# Patient Record
Sex: MALE | Race: WHITE | NOT HISPANIC OR LATINO | Employment: OTHER | URBAN - METROPOLITAN AREA
[De-identification: names, ages, dates, MRNs, and addresses within clinical notes are randomized per-mention and may not be internally consistent; named-entity substitution may affect disease eponyms.]

---

## 2024-04-15 ENCOUNTER — HOSPITAL ENCOUNTER (INPATIENT)
Facility: HOSPITAL | Age: 59
LOS: 2 days | Discharge: HOME | DRG: 377 | End: 2024-04-17
Attending: STUDENT IN AN ORGANIZED HEALTH CARE EDUCATION/TRAINING PROGRAM | Admitting: INTERNAL MEDICINE
Payer: MEDICARE

## 2024-04-15 DIAGNOSIS — K92.2 GASTROINTESTINAL HEMORRHAGE, UNSPECIFIED GASTROINTESTINAL HEMORRHAGE TYPE: Primary | ICD-10-CM

## 2024-04-15 DIAGNOSIS — N18.6 ESRD (END STAGE RENAL DISEASE) (MULTI): ICD-10-CM

## 2024-04-15 LAB
ABO GROUP (TYPE) IN BLOOD: NORMAL
ALBUMIN SERPL BCP-MCNC: 3.2 G/DL (ref 3.4–5)
ALP SERPL-CCNC: 28 U/L (ref 33–120)
ALT SERPL W P-5'-P-CCNC: 11 U/L (ref 10–52)
ANION GAP BLDV CALCULATED.4IONS-SCNC: 17 MMOL/L (ref 10–25)
ANION GAP SERPL CALC-SCNC: 21 MMOL/L (ref 10–20)
ANTIBODY SCREEN: NORMAL
APTT PPP: 33 SECONDS (ref 27–38)
AST SERPL W P-5'-P-CCNC: 17 U/L (ref 9–39)
BASE EXCESS BLDV CALC-SCNC: -2.1 MMOL/L (ref -2–3)
BASOPHILS # BLD AUTO: 0.09 X10*3/UL (ref 0–0.1)
BASOPHILS NFR BLD AUTO: 0.6 %
BILIRUB SERPL-MCNC: 0.3 MG/DL (ref 0–1.2)
BODY TEMPERATURE: 37 DEGREES CELSIUS
BUN SERPL-MCNC: 63 MG/DL (ref 6–23)
CA-I BLDV-SCNC: 1.2 MMOL/L (ref 1.1–1.33)
CALCIUM SERPL-MCNC: 9.1 MG/DL (ref 8.6–10.3)
CHLORIDE BLDV-SCNC: 98 MMOL/L (ref 98–107)
CHLORIDE SERPL-SCNC: 97 MMOL/L (ref 98–107)
CO2 SERPL-SCNC: 20 MMOL/L (ref 21–32)
CREAT SERPL-MCNC: 8.27 MG/DL (ref 0.5–1.3)
EGFRCR SERPLBLD CKD-EPI 2021: 7 ML/MIN/1.73M*2
EOSINOPHIL # BLD AUTO: 0.29 X10*3/UL (ref 0–0.7)
EOSINOPHIL NFR BLD AUTO: 2 %
ERYTHROCYTE [DISTWIDTH] IN BLOOD BY AUTOMATED COUNT: 15 % (ref 11.5–14.5)
GLUCOSE BLDV-MCNC: 101 MG/DL (ref 74–99)
GLUCOSE SERPL-MCNC: 101 MG/DL (ref 74–99)
HCO3 BLDV-SCNC: 23.7 MMOL/L (ref 22–26)
HCT VFR BLD AUTO: 29.6 % (ref 41–52)
HCT VFR BLD EST: 29 % (ref 41–52)
HGB BLD-MCNC: 9.4 G/DL (ref 13.5–17.5)
HGB BLDV-MCNC: 9.7 G/DL (ref 13.5–17.5)
IMM GRANULOCYTES # BLD AUTO: 0.09 X10*3/UL (ref 0–0.7)
IMM GRANULOCYTES NFR BLD AUTO: 0.6 % (ref 0–0.9)
INHALED O2 CONCENTRATION: 21 %
INR PPP: 1.5 (ref 0.9–1.1)
LACTATE BLDV-SCNC: 1.6 MMOL/L (ref 0.4–2)
LACTATE SERPL-SCNC: 0.9 MMOL/L (ref 0.4–2)
LYMPHOCYTES # BLD AUTO: 1.78 X10*3/UL (ref 1.2–4.8)
LYMPHOCYTES NFR BLD AUTO: 12.1 %
MCH RBC QN AUTO: 29.9 PG (ref 26–34)
MCHC RBC AUTO-ENTMCNC: 31.8 G/DL (ref 32–36)
MCV RBC AUTO: 94 FL (ref 80–100)
MONOCYTES # BLD AUTO: 0.8 X10*3/UL (ref 0.1–1)
MONOCYTES NFR BLD AUTO: 5.4 %
NEUTROPHILS # BLD AUTO: 11.64 X10*3/UL (ref 1.2–7.7)
NEUTROPHILS NFR BLD AUTO: 79.3 %
NRBC BLD-RTO: 0 /100 WBCS (ref 0–0)
OXYHGB MFR BLDV: 55.8 % (ref 45–75)
PCO2 BLDV: 44 MM HG (ref 41–51)
PH BLDV: 7.34 PH (ref 7.33–7.43)
PLATELET # BLD AUTO: 419 X10*3/UL (ref 150–450)
PO2 BLDV: 42 MM HG (ref 35–45)
POTASSIUM BLDV-SCNC: 5.4 MMOL/L (ref 3.5–5.3)
POTASSIUM SERPL-SCNC: 5.1 MMOL/L (ref 3.5–5.3)
PROT SERPL-MCNC: 6.9 G/DL (ref 6.4–8.2)
PROTHROMBIN TIME: 16.6 SECONDS (ref 9.8–12.8)
RBC # BLD AUTO: 3.14 X10*6/UL (ref 4.5–5.9)
RH FACTOR (ANTIGEN D): NORMAL
SAO2 % BLDV: 57 % (ref 45–75)
SODIUM BLDV-SCNC: 133 MMOL/L (ref 136–145)
SODIUM SERPL-SCNC: 133 MMOL/L (ref 136–145)
WBC # BLD AUTO: 14.7 X10*3/UL (ref 4.4–11.3)

## 2024-04-15 PROCEDURE — 86920 COMPATIBILITY TEST SPIN: CPT

## 2024-04-15 PROCEDURE — 84132 ASSAY OF SERUM POTASSIUM: CPT | Performed by: STUDENT IN AN ORGANIZED HEALTH CARE EDUCATION/TRAINING PROGRAM

## 2024-04-15 PROCEDURE — 2500000004 HC RX 250 GENERAL PHARMACY W/ HCPCS (ALT 636 FOR OP/ED): Performed by: STUDENT IN AN ORGANIZED HEALTH CARE EDUCATION/TRAINING PROGRAM

## 2024-04-15 PROCEDURE — C9113 INJ PANTOPRAZOLE SODIUM, VIA: HCPCS | Performed by: STUDENT IN AN ORGANIZED HEALTH CARE EDUCATION/TRAINING PROGRAM

## 2024-04-15 PROCEDURE — 82728 ASSAY OF FERRITIN: CPT | Mod: PARLAB

## 2024-04-15 PROCEDURE — 83540 ASSAY OF IRON: CPT

## 2024-04-15 PROCEDURE — 36415 COLL VENOUS BLD VENIPUNCTURE: CPT | Performed by: STUDENT IN AN ORGANIZED HEALTH CARE EDUCATION/TRAINING PROGRAM

## 2024-04-15 PROCEDURE — 99285 EMERGENCY DEPT VISIT HI MDM: CPT | Mod: 25

## 2024-04-15 PROCEDURE — 86900 BLOOD TYPING SEROLOGIC ABO: CPT | Performed by: STUDENT IN AN ORGANIZED HEALTH CARE EDUCATION/TRAINING PROGRAM

## 2024-04-15 PROCEDURE — 2500000001 HC RX 250 WO HCPCS SELF ADMINISTERED DRUGS (ALT 637 FOR MEDICARE OP)

## 2024-04-15 PROCEDURE — 85025 COMPLETE CBC W/AUTO DIFF WBC: CPT | Performed by: STUDENT IN AN ORGANIZED HEALTH CARE EDUCATION/TRAINING PROGRAM

## 2024-04-15 PROCEDURE — 96374 THER/PROPH/DIAG INJ IV PUSH: CPT

## 2024-04-15 PROCEDURE — 2060000001 HC INTERMEDIATE ICU ROOM DAILY

## 2024-04-15 PROCEDURE — 85730 THROMBOPLASTIN TIME PARTIAL: CPT | Performed by: STUDENT IN AN ORGANIZED HEALTH CARE EDUCATION/TRAINING PROGRAM

## 2024-04-15 PROCEDURE — 83605 ASSAY OF LACTIC ACID: CPT | Performed by: STUDENT IN AN ORGANIZED HEALTH CARE EDUCATION/TRAINING PROGRAM

## 2024-04-15 RX ORDER — METOPROLOL TARTRATE 25 MG/1
25 TABLET, FILM COATED ORAL 2 TIMES DAILY
COMMUNITY

## 2024-04-15 RX ORDER — ACETAMINOPHEN 160 MG/5ML
650 SOLUTION ORAL EVERY 4 HOURS PRN
Status: DISCONTINUED | OUTPATIENT
Start: 2024-04-15 | End: 2024-04-17 | Stop reason: HOSPADM

## 2024-04-15 RX ORDER — POLYETHYLENE GLYCOL 3350 17 G/17G
17 POWDER, FOR SOLUTION ORAL DAILY
Status: DISCONTINUED | OUTPATIENT
Start: 2024-04-16 | End: 2024-04-17 | Stop reason: HOSPADM

## 2024-04-15 RX ORDER — TRAZODONE HYDROCHLORIDE 50 MG/1
50 TABLET ORAL NIGHTLY
COMMUNITY

## 2024-04-15 RX ORDER — TRAZODONE HYDROCHLORIDE 50 MG/1
50 TABLET ORAL NIGHTLY
Status: DISCONTINUED | OUTPATIENT
Start: 2024-04-15 | End: 2024-04-17 | Stop reason: HOSPADM

## 2024-04-15 RX ORDER — PANTOPRAZOLE SODIUM 40 MG/10ML
80 INJECTION, POWDER, LYOPHILIZED, FOR SOLUTION INTRAVENOUS ONCE
Status: COMPLETED | OUTPATIENT
Start: 2024-04-15 | End: 2024-04-15

## 2024-04-15 RX ORDER — ACETAMINOPHEN 650 MG/1
650 SUPPOSITORY RECTAL EVERY 4 HOURS PRN
Status: DISCONTINUED | OUTPATIENT
Start: 2024-04-15 | End: 2024-04-17 | Stop reason: HOSPADM

## 2024-04-15 RX ORDER — METOPROLOL SUCCINATE 25 MG/1
25 TABLET, EXTENDED RELEASE ORAL
Status: ON HOLD | COMMUNITY
End: 2024-04-15 | Stop reason: WASHOUT

## 2024-04-15 RX ORDER — PANTOPRAZOLE SODIUM 40 MG/10ML
40 INJECTION, POWDER, LYOPHILIZED, FOR SOLUTION INTRAVENOUS 2 TIMES DAILY
Status: DISCONTINUED | OUTPATIENT
Start: 2024-04-16 | End: 2024-04-17 | Stop reason: HOSPADM

## 2024-04-15 RX ORDER — METOPROLOL TARTRATE 25 MG/1
25 TABLET, FILM COATED ORAL 2 TIMES DAILY
Status: DISCONTINUED | OUTPATIENT
Start: 2024-04-15 | End: 2024-04-17 | Stop reason: HOSPADM

## 2024-04-15 RX ORDER — ACETAMINOPHEN 325 MG/1
650 TABLET ORAL EVERY 4 HOURS PRN
Status: DISCONTINUED | OUTPATIENT
Start: 2024-04-15 | End: 2024-04-17 | Stop reason: HOSPADM

## 2024-04-15 RX ADMIN — SODIUM CHLORIDE 500 ML: 9 INJECTION, SOLUTION INTRAVENOUS at 20:56

## 2024-04-15 RX ADMIN — SODIUM CHLORIDE 500 ML: 9 INJECTION, SOLUTION INTRAVENOUS at 19:46

## 2024-04-15 RX ADMIN — TRAZODONE HYDROCHLORIDE 50 MG: 50 TABLET ORAL at 23:06

## 2024-04-15 RX ADMIN — PANTOPRAZOLE SODIUM 80 MG: 40 INJECTION, POWDER, FOR SOLUTION INTRAVENOUS at 19:47

## 2024-04-15 SDOH — SOCIAL STABILITY: SOCIAL INSECURITY: HAS ANYONE EVER THREATENED TO HURT YOUR FAMILY OR YOUR PETS?: NO

## 2024-04-15 SDOH — SOCIAL STABILITY: SOCIAL INSECURITY: DO YOU FEEL UNSAFE GOING BACK TO THE PLACE WHERE YOU ARE LIVING?: NO

## 2024-04-15 SDOH — ECONOMIC STABILITY: INCOME INSECURITY: IN THE LAST 12 MONTHS, WAS THERE A TIME WHEN YOU WERE NOT ABLE TO PAY THE MORTGAGE OR RENT ON TIME?: PATIENT DECLINED

## 2024-04-15 SDOH — SOCIAL STABILITY: SOCIAL INSECURITY: HAVE YOU HAD THOUGHTS OF HARMING ANYONE ELSE?: NO

## 2024-04-15 SDOH — SOCIAL STABILITY: SOCIAL INSECURITY: ARE YOU OR HAVE YOU BEEN THREATENED OR ABUSED PHYSICALLY, EMOTIONALLY, OR SEXUALLY BY ANYONE?: NO

## 2024-04-15 SDOH — ECONOMIC STABILITY: TRANSPORTATION INSECURITY
IN THE PAST 12 MONTHS, HAS LACK OF TRANSPORTATION KEPT YOU FROM MEETINGS, WORK, OR FROM GETTING THINGS NEEDED FOR DAILY LIVING?: PATIENT DECLINED

## 2024-04-15 SDOH — SOCIAL STABILITY: SOCIAL INSECURITY: DOES ANYONE TRY TO KEEP YOU FROM HAVING/CONTACTING OTHER FRIENDS OR DOING THINGS OUTSIDE YOUR HOME?: NO

## 2024-04-15 SDOH — ECONOMIC STABILITY: TRANSPORTATION INSECURITY
IN THE PAST 12 MONTHS, HAS THE LACK OF TRANSPORTATION KEPT YOU FROM MEDICAL APPOINTMENTS OR FROM GETTING MEDICATIONS?: PATIENT DECLINED

## 2024-04-15 SDOH — SOCIAL STABILITY: SOCIAL INSECURITY: ARE THERE ANY APPARENT SIGNS OF INJURIES/BEHAVIORS THAT COULD BE RELATED TO ABUSE/NEGLECT?: NO

## 2024-04-15 SDOH — SOCIAL STABILITY: SOCIAL INSECURITY: ABUSE: ADULT

## 2024-04-15 SDOH — ECONOMIC STABILITY: HOUSING INSECURITY
IN THE LAST 12 MONTHS, WAS THERE A TIME WHEN YOU DID NOT HAVE A STEADY PLACE TO SLEEP OR SLEPT IN A SHELTER (INCLUDING NOW)?: PATIENT DECLINED

## 2024-04-15 SDOH — ECONOMIC STABILITY: HOUSING INSECURITY: IN THE LAST 12 MONTHS, HOW MANY PLACES HAVE YOU LIVED?: 1

## 2024-04-15 SDOH — ECONOMIC STABILITY: INCOME INSECURITY: HOW HARD IS IT FOR YOU TO PAY FOR THE VERY BASICS LIKE FOOD, HOUSING, MEDICAL CARE, AND HEATING?: PATIENT DECLINED

## 2024-04-15 SDOH — SOCIAL STABILITY: SOCIAL INSECURITY: DO YOU FEEL ANYONE HAS EXPLOITED OR TAKEN ADVANTAGE OF YOU FINANCIALLY OR OF YOUR PERSONAL PROPERTY?: NO

## 2024-04-15 ASSESSMENT — COGNITIVE AND FUNCTIONAL STATUS - GENERAL
DAILY ACTIVITIY SCORE: 24
STANDING UP FROM CHAIR USING ARMS: A LITTLE
PATIENT BASELINE BEDBOUND: NO
MOBILITY SCORE: 20
CLIMB 3 TO 5 STEPS WITH RAILING: A LITTLE
WALKING IN HOSPITAL ROOM: A LITTLE
MOVING TO AND FROM BED TO CHAIR: A LITTLE

## 2024-04-15 ASSESSMENT — PAIN SCALES - GENERAL
PAINLEVEL_OUTOF10: 0 - NO PAIN
PAINLEVEL_OUTOF10: 0 - NO PAIN

## 2024-04-15 ASSESSMENT — ACTIVITIES OF DAILY LIVING (ADL)
LACK_OF_TRANSPORTATION: PATIENT DECLINED
FEEDING YOURSELF: INDEPENDENT
HEARING - LEFT EAR: FUNCTIONAL
JUDGMENT_ADEQUATE_SAFELY_COMPLETE_DAILY_ACTIVITIES: YES
TOILETING: INDEPENDENT
BATHING: INDEPENDENT
HEARING - RIGHT EAR: FUNCTIONAL
DRESSING YOURSELF: INDEPENDENT
GROOMING: INDEPENDENT
PATIENT'S MEMORY ADEQUATE TO SAFELY COMPLETE DAILY ACTIVITIES?: YES
WALKS IN HOME: INDEPENDENT
ADEQUATE_TO_COMPLETE_ADL: YES

## 2024-04-15 ASSESSMENT — LIFESTYLE VARIABLES
HAVE PEOPLE ANNOYED YOU BY CRITICIZING YOUR DRINKING: NO
SKIP TO QUESTIONS 9-10: 1
EVER HAD A DRINK FIRST THING IN THE MORNING TO STEADY YOUR NERVES TO GET RID OF A HANGOVER: NO
AUDIT-C TOTAL SCORE: 0
HOW OFTEN DO YOU HAVE A DRINK CONTAINING ALCOHOL: NEVER
HOW OFTEN DO YOU HAVE 6 OR MORE DRINKS ON ONE OCCASION: NEVER
TOTAL SCORE: 0
HOW MANY STANDARD DRINKS CONTAINING ALCOHOL DO YOU HAVE ON A TYPICAL DAY: PATIENT DOES NOT DRINK
EVER FELT BAD OR GUILTY ABOUT YOUR DRINKING: NO
HAVE YOU EVER FELT YOU SHOULD CUT DOWN ON YOUR DRINKING: NO
AUDIT-C TOTAL SCORE: 0

## 2024-04-15 ASSESSMENT — PATIENT HEALTH QUESTIONNAIRE - PHQ9
SUM OF ALL RESPONSES TO PHQ9 QUESTIONS 1 & 2: 0
1. LITTLE INTEREST OR PLEASURE IN DOING THINGS: NOT AT ALL
2. FEELING DOWN, DEPRESSED OR HOPELESS: NOT AT ALL

## 2024-04-15 ASSESSMENT — PAIN - FUNCTIONAL ASSESSMENT
PAIN_FUNCTIONAL_ASSESSMENT: 0-10
PAIN_FUNCTIONAL_ASSESSMENT: 0-10

## 2024-04-16 ENCOUNTER — APPOINTMENT (OUTPATIENT)
Dept: DIALYSIS | Facility: HOSPITAL | Age: 59
End: 2024-04-16
Payer: MEDICARE

## 2024-04-16 LAB
ALBUMIN SERPL BCP-MCNC: 2.8 G/DL (ref 3.4–5)
ANION GAP SERPL CALC-SCNC: 20 MMOL/L (ref 10–20)
APTT PPP: 32 SECONDS (ref 27–38)
BUN SERPL-MCNC: 70 MG/DL (ref 6–23)
CALCIUM SERPL-MCNC: 8.5 MG/DL (ref 8.6–10.3)
CHLORIDE SERPL-SCNC: 99 MMOL/L (ref 98–107)
CO2 SERPL-SCNC: 22 MMOL/L (ref 21–32)
CREAT SERPL-MCNC: 9.42 MG/DL (ref 0.5–1.3)
EGFRCR SERPLBLD CKD-EPI 2021: 6 ML/MIN/1.73M*2
ERYTHROCYTE [DISTWIDTH] IN BLOOD BY AUTOMATED COUNT: 14.8 % (ref 11.5–14.5)
ERYTHROCYTE [DISTWIDTH] IN BLOOD BY AUTOMATED COUNT: 14.9 % (ref 11.5–14.5)
FERRITIN SERPL-MCNC: 730 NG/ML (ref 20–300)
GLUCOSE SERPL-MCNC: 78 MG/DL (ref 74–99)
HCT VFR BLD AUTO: 21.4 % (ref 41–52)
HCT VFR BLD AUTO: 22.4 % (ref 41–52)
HGB BLD-MCNC: 6.8 G/DL (ref 13.5–17.5)
HGB BLD-MCNC: 7.1 G/DL (ref 13.5–17.5)
INR PPP: 1.5 (ref 0.9–1.1)
IRON SATN MFR SERPL: 29 % (ref 25–45)
IRON SERPL-MCNC: 45 UG/DL (ref 35–150)
MCH RBC QN AUTO: 29.2 PG (ref 26–34)
MCH RBC QN AUTO: 29.2 PG (ref 26–34)
MCHC RBC AUTO-ENTMCNC: 31.7 G/DL (ref 32–36)
MCHC RBC AUTO-ENTMCNC: 31.8 G/DL (ref 32–36)
MCV RBC AUTO: 92 FL (ref 80–100)
MCV RBC AUTO: 92 FL (ref 80–100)
NRBC BLD-RTO: 0 /100 WBCS (ref 0–0)
NRBC BLD-RTO: 0 /100 WBCS (ref 0–0)
PHOSPHATE SERPL-MCNC: 7.6 MG/DL (ref 2.5–4.9)
PLATELET # BLD AUTO: 355 X10*3/UL (ref 150–450)
PLATELET # BLD AUTO: 361 X10*3/UL (ref 150–450)
POTASSIUM SERPL-SCNC: 5.6 MMOL/L (ref 3.5–5.3)
PROTHROMBIN TIME: 16.5 SECONDS (ref 9.8–12.8)
RBC # BLD AUTO: 2.33 X10*6/UL (ref 4.5–5.9)
RBC # BLD AUTO: 2.43 X10*6/UL (ref 4.5–5.9)
SODIUM SERPL-SCNC: 135 MMOL/L (ref 136–145)
TIBC SERPL-MCNC: 156 UG/DL (ref 240–445)
UIBC SERPL-MCNC: 111 UG/DL (ref 110–370)
WBC # BLD AUTO: 6.3 X10*3/UL (ref 4.4–11.3)
WBC # BLD AUTO: 6.5 X10*3/UL (ref 4.4–11.3)

## 2024-04-16 PROCEDURE — 85610 PROTHROMBIN TIME: CPT

## 2024-04-16 PROCEDURE — C9113 INJ PANTOPRAZOLE SODIUM, VIA: HCPCS

## 2024-04-16 PROCEDURE — 8010000001 HC DIALYSIS - HEMODIALYSIS PER DAY

## 2024-04-16 PROCEDURE — 5A1D70Z PERFORMANCE OF URINARY FILTRATION, INTERMITTENT, LESS THAN 6 HOURS PER DAY: ICD-10-PCS | Performed by: INTERNAL MEDICINE

## 2024-04-16 PROCEDURE — 99222 1ST HOSP IP/OBS MODERATE 55: CPT | Performed by: NURSE PRACTITIONER

## 2024-04-16 PROCEDURE — 84100 ASSAY OF PHOSPHORUS: CPT

## 2024-04-16 PROCEDURE — 36415 COLL VENOUS BLD VENIPUNCTURE: CPT

## 2024-04-16 PROCEDURE — 2500000004 HC RX 250 GENERAL PHARMACY W/ HCPCS (ALT 636 FOR OP/ED)

## 2024-04-16 PROCEDURE — 85027 COMPLETE CBC AUTOMATED: CPT

## 2024-04-16 PROCEDURE — 2500000001 HC RX 250 WO HCPCS SELF ADMINISTERED DRUGS (ALT 637 FOR MEDICARE OP)

## 2024-04-16 PROCEDURE — 2060000001 HC INTERMEDIATE ICU ROOM DAILY

## 2024-04-16 RX ADMIN — IRON SUCROSE 400 MG: 20 INJECTION, SOLUTION INTRAVENOUS at 06:14

## 2024-04-16 RX ADMIN — PANTOPRAZOLE SODIUM 40 MG: 40 INJECTION, POWDER, FOR SOLUTION INTRAVENOUS at 00:21

## 2024-04-16 RX ADMIN — POLYETHYLENE GLYCOL 3350 17 G: 17 POWDER, FOR SOLUTION ORAL at 08:17

## 2024-04-16 RX ADMIN — TRAZODONE HYDROCHLORIDE 50 MG: 50 TABLET ORAL at 20:59

## 2024-04-16 RX ADMIN — PANTOPRAZOLE SODIUM 40 MG: 40 INJECTION, POWDER, FOR SOLUTION INTRAVENOUS at 21:00

## 2024-04-16 RX ADMIN — PANTOPRAZOLE SODIUM 40 MG: 40 INJECTION, POWDER, FOR SOLUTION INTRAVENOUS at 08:17

## 2024-04-16 ASSESSMENT — COGNITIVE AND FUNCTIONAL STATUS - GENERAL
MOBILITY SCORE: 24
DAILY ACTIVITIY SCORE: 24

## 2024-04-16 ASSESSMENT — PAIN SCALES - GENERAL
PAINLEVEL_OUTOF10: 0 - NO PAIN
PAINLEVEL_OUTOF10: 0 - NO PAIN

## 2024-04-16 NOTE — NURSING NOTE
Report from Sending RN:    Report From: Collette  Recent Surgery of Procedure: No  Baseline Level of Consciousness (LOC): a/o x3  Oxygen Use: No  Type: RA  Diabetic: No  Last BP Med Given Day of Dialysis: See MAR  Last Pain Med Given: See MAR  Lab Tests to be Obtained with Dialysis: No  Blood Transfusion to be Given During Dialysis: Yes  Available IV Access: Yes  Medications to be Administered During Dialysis: No  Continuous IV Infusion Running: No  Restraints on Currently or in the Last 24 Hours: No  Hand-Off Communication: full code; stable for hd; blood ordered for patient  Dialysis Catheter Dressing: N/A  Last Dressing Change: N/A

## 2024-04-16 NOTE — ED PROVIDER NOTES
HPI   Chief Complaint   Patient presents with   • Rectal Bleeding       Patient is a 58-year-old male history of ESRD presenting to the emergency department for bloody bowel movements.  He states that he had multiple bloody bowel movements earlier today he denies any abdominal pain nausea vomiting fever chills or any other muscle aches and pains.  He has no other muscle aches or pains to report of.  He did not lose consciousness however did feel mildly dizzy.                          [place] Coma Scale Score: 15                     Patient History   No past medical history on file.  No past surgical history on file.  No family history on file.  Social History     Tobacco Use   • Smoking status: Not on file   • Smokeless tobacco: Not on file   Substance Use Topics   • Alcohol use: Not on file   • Drug use: Not on file       Physical Exam   ED Triage Vitals   Temperature Heart Rate Respirations BP   04/15/24 1929 04/15/24 1929 04/15/24 1929 04/15/24 1929   36.5 °C (97.7 °F) 83 20 118/77      Pulse Ox Temp src Heart Rate Source Patient Position   04/15/24 1929 -- 04/15/24 2013 04/15/24 2013   97 %  Monitor Lying      BP Location FiO2 (%)     04/15/24 2013 --     Right arm        Physical Exam  Vitals reviewed.   Constitutional:       Appearance: Normal appearance.   HENT:      Head: Normocephalic and atraumatic.      Nose: Nose normal.      Mouth/Throat:      Mouth: Mucous membranes are moist.   Eyes:      Extraocular Movements: Extraocular movements intact.      Conjunctiva/sclera: Conjunctivae normal.   Cardiovascular:      Rate and Rhythm: Normal rate and regular rhythm.      Pulses: Normal pulses.      Heart sounds: Normal heart sounds.   Pulmonary:      Effort: Pulmonary effort is normal.      Breath sounds: Normal breath sounds.   Abdominal:      General: Abdomen is flat. Bowel sounds are normal.      Palpations: Abdomen is soft.      Tenderness: There is no abdominal tenderness.   Musculoskeletal:          General: Normal range of motion.   Skin:     General: Skin is warm and dry.      Capillary Refill: Capillary refill takes less than 2 seconds.   Neurological:      Mental Status: He is alert and oriented to person, place, and time. Mental status is at baseline.      Cranial Nerves: Cranial nerves 2-12 are intact. No cranial nerve deficit.      Sensory: Sensation is intact. No sensory deficit.      Motor: Motor function is intact. No weakness.   Psychiatric:         Mood and Affect: Mood normal.         ED Course & MDM   ED Course as of 04/15/24 2045   Mon Apr 15, 2024   1920 58-year-old history of history of ESRD presenting to the emergency department for bloody bowel movements.  On examination vital signs are stable 2+ peripheral pulses abdomen nontender.  He is mildly jaundiced appearing however has no right upper quadrant abdominal tenderness.  No rebound no guarding no rigidity.  He has a differential diagnosis of upper GI bleed versus lower GI bleed versus cancer.  Since patient is currently not actively hemorrhaging and not hypotensive and has no abdominal pain CT imaging will be deferred.  I did discuss the case with Dr. Ceballos he agrees. [ZS]   2040 Vital signs continuous predominately stable last blood pressure is 90/62.  Will give another 500 cc bolus of IV fluids hemoglobin is 9.4 plaqued is 0.9 patient's not acidotic on blood gas CMP is consistent with ESRD.  Patient will be admitted to stepdown unit to Dr. Rosado's service. [ZS]      ED Course User Index  [ZS] Caitlyn Neal MD         Diagnoses as of 04/15/24 2045   Gastrointestinal hemorrhage, unspecified gastrointestinal hemorrhage type   ESRD (end stage renal disease) (Multi)       Medical Decision Making      Procedure  Procedures     Caitlyn Neal MD  04/15/24 2045

## 2024-04-16 NOTE — CONSULTS
Reason For Consult  GI bleed    History Of Present Illness  Rai Gant is a 58 y.o. 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 He earlier went for a prostate biopsy today with urologist at [facility] who made him aware that having some blood in his stools was to be expected after undergoing biopsy.   He denies any abdominal pain, nausea, vomiting, any other recent diarrhea.  He denies any shortness of breath, chest pain, palpitations.   He is currently on the waiting list for a kidney transplant at [facility].       Consult for GI bleed.  Patient was seen and examined at the bedside in the dialysis in patient's room.  He denied any abdominal or epigastric pain, nausea nausea or dysphagia.  No previous history of upper or lower GI bleed.  He describes approximately 6-7 dark red bloody BMs yesterday after returning biopsy appointment.  Last BM approximately 8 PM, no BMs so far today, however he states he feels urge to move his bowels.  No NSAIDs use    Colonoscopy 5/25/2023: Three 1 to 3 mm polyps in the ascending colon and in the cecum removed with cold snare resected and retrieved.  Diverticulosis in the entire examined colon.  Nonbleeding internal hemorrhoids    H&H today 7.1, on admission 9.4.  No baseline available.  No leukocytosis or thrombocytopenia.  INR 1.5.  Serum sodium 135, potassium 5.62-day before dialysis session, creatinine 9.42, BUN 70, serum iron 45, ferritin 730, TIBC 156, saturation 29    Past Medical History  He has no past medical history on file.    Surgical History  He has no past surgical history on file.     Social History  He reports that he has never smoked. He does not have any smokeless tobacco history on file. He reports that he does not  "drink alcohol. No history on file for drug use.    Family History  No family history on file.     Allergies  Patient has no known allergies.    Review of Systems     A 10 point review of system is negative except for what is mentioned in the HPI    Physical Exam    The note was created using voice recognition transcription software. Despite proofreading, unintentional typographical errors may be present. Please contact the GI office with any questions or concerns.     Current Medications: reviewed    Vital Signs: Reviewed    Physical Exam:  General: no apparent distress, pleasant and cooperative  Skin:  Warm and dry, no jaundice  HEENT: No scleral icterus, no conjunctival pallor, normocephalic, atraumatic, mucous membranes moist  Neck:  atraumatic, trachea midline, no JVD  Chest:  decreased air entry to auscultation bilaterally. No wheezes, rales, or rhonchi  CV:  Regular rate and rhythm.  Positive S1/S2  Abdomen: no distension, +BS, soft, non-tender to palpation, no rebound tenderness, no guarding, no rigidity, no discernible ascites   Extremities: no lower extremity edema, Chronic pigmentary changes, no cyanosis  Neurological:  A&Ox3 , no asterixis  Psychiatric: cooperative     Investigations:  Labs, radiological imaging and cardiac work up were reviewed    Last Recorded Vitals  Blood pressure 107/61, pulse 84, temperature 36.8 °C (98.2 °F), temperature source Temporal, resp. rate 20, height 1.722 m (5' 7.8\"), weight 64 kg (141 lb 1.5 oz), SpO2 95%.    Relevant Results      Scheduled medications  [Held by provider] metoprolol tartrate, 25 mg, oral, BID  pantoprazole, 40 mg, intravenous, BID  polyethylene glycol, 17 g, oral, Daily  traZODone, 50 mg, oral, Nightly      Continuous medications     PRN medications  PRN medications: acetaminophen **OR** acetaminophen **OR** acetaminophen    Results for orders placed or performed during the hospital encounter of 04/15/24 (from the past 24 hour(s))   Coagulation Screen "   Result Value Ref Range    Protime 16.6 (H) 9.8 - 12.8 seconds    INR 1.5 (H) 0.9 - 1.1    aPTT 33 27 - 38 seconds   CBC and Auto Differential   Result Value Ref Range    WBC 14.7 (H) 4.4 - 11.3 x10*3/uL    nRBC 0.0 0.0 - 0.0 /100 WBCs    RBC 3.14 (L) 4.50 - 5.90 x10*6/uL    Hemoglobin 9.4 (L) 13.5 - 17.5 g/dL    Hematocrit 29.6 (L) 41.0 - 52.0 %    MCV 94 80 - 100 fL    MCH 29.9 26.0 - 34.0 pg    MCHC 31.8 (L) 32.0 - 36.0 g/dL    RDW 15.0 (H) 11.5 - 14.5 %    Platelets 419 150 - 450 x10*3/uL    Neutrophils % 79.3 40.0 - 80.0 %    Immature Granulocytes %, Automated 0.6 0.0 - 0.9 %    Lymphocytes % 12.1 13.0 - 44.0 %    Monocytes % 5.4 2.0 - 10.0 %    Eosinophils % 2.0 0.0 - 6.0 %    Basophils % 0.6 0.0 - 2.0 %    Neutrophils Absolute 11.64 (H) 1.20 - 7.70 x10*3/uL    Immature Granulocytes Absolute, Automated 0.09 0.00 - 0.70 x10*3/uL    Lymphocytes Absolute 1.78 1.20 - 4.80 x10*3/uL    Monocytes Absolute 0.80 0.10 - 1.00 x10*3/uL    Eosinophils Absolute 0.29 0.00 - 0.70 x10*3/uL    Basophils Absolute 0.09 0.00 - 0.10 x10*3/uL   Comprehensive Metabolic Panel   Result Value Ref Range    Glucose 101 (H) 74 - 99 mg/dL    Sodium 133 (L) 136 - 145 mmol/L    Potassium 5.1 3.5 - 5.3 mmol/L    Chloride 97 (L) 98 - 107 mmol/L    Bicarbonate 20 (L) 21 - 32 mmol/L    Anion Gap 21 (H) 10 - 20 mmol/L    Urea Nitrogen 63 (H) 6 - 23 mg/dL    Creatinine 8.27 (H) 0.50 - 1.30 mg/dL    eGFR 7 (L) >60 mL/min/1.73m*2    Calcium 9.1 8.6 - 10.3 mg/dL    Albumin 3.2 (L) 3.4 - 5.0 g/dL    Alkaline Phosphatase 28 (L) 33 - 120 U/L    Total Protein 6.9 6.4 - 8.2 g/dL    AST 17 9 - 39 U/L    Bilirubin, Total 0.3 0.0 - 1.2 mg/dL    ALT 11 10 - 52 U/L   Blood Gas Venous Full Panel   Result Value Ref Range    POCT pH, Venous 7.34 7.33 - 7.43 pH    POCT pCO2, Venous 44 41 - 51 mm Hg    POCT pO2, Venous 42 35 - 45 mm Hg    POCT SO2, Venous 57 45 - 75 %    POCT Oxy Hemoglobin, Venous 55.8 45.0 - 75.0 %    POCT Hematocrit Calculated, Venous 29.0 (L)  41.0 - 52.0 %    POCT Sodium, Venous 133 (L) 136 - 145 mmol/L    POCT Potassium, Venous 5.4 (H) 3.5 - 5.3 mmol/L    POCT Chloride, Venous 98 98 - 107 mmol/L    POCT Ionized Calicum, Venous 1.20 1.10 - 1.33 mmol/L    POCT Glucose, Venous 101 (H) 74 - 99 mg/dL    POCT Lactate, Venous 1.6 0.4 - 2.0 mmol/L    POCT Base Excess, Venous -2.1 (L) -2.0 - 3.0 mmol/L    POCT HCO3 Calculated, Venous 23.7 22.0 - 26.0 mmol/L    POCT Hemoglobin, Venous 9.7 (L) 13.5 - 17.5 g/dL    POCT Anion Gap, Venous 17.0 10.0 - 25.0 mmol/L    Patient Temperature 37.0 degrees Celsius    FiO2 21 %   Lactate   Result Value Ref Range    Lactate 0.9 0.4 - 2.0 mmol/L   Iron and TIBC   Result Value Ref Range    Iron 45 35 - 150 ug/dL    UIBC 111 110 - 370 ug/dL    TIBC 156 (L) 240 - 445 ug/dL    % Saturation 29 25 - 45 %   Ferritin   Result Value Ref Range    Ferritin 730 (H) 20 - 300 ng/mL   Type And Screen   Result Value Ref Range    ABO TYPE O     Rh TYPE NEG     ANTIBODY SCREEN NEG    CBC   Result Value Ref Range    WBC 6.5 4.4 - 11.3 x10*3/uL    nRBC 0.0 0.0 - 0.0 /100 WBCs    RBC 2.33 (L) 4.50 - 5.90 x10*6/uL    Hemoglobin 6.8 (L) 13.5 - 17.5 g/dL    Hematocrit 21.4 (L) 41.0 - 52.0 %    MCV 92 80 - 100 fL    MCH 29.2 26.0 - 34.0 pg    MCHC 31.8 (L) 32.0 - 36.0 g/dL    RDW 14.9 (H) 11.5 - 14.5 %    Platelets 355 150 - 450 x10*3/uL   Renal Function Panel   Result Value Ref Range    Glucose 78 74 - 99 mg/dL    Sodium 135 (L) 136 - 145 mmol/L    Potassium 5.6 (H) 3.5 - 5.3 mmol/L    Chloride 99 98 - 107 mmol/L    Bicarbonate 22 21 - 32 mmol/L    Anion Gap 20 10 - 20 mmol/L    Urea Nitrogen 70 (H) 6 - 23 mg/dL    Creatinine 9.42 (H) 0.50 - 1.30 mg/dL    eGFR 6 (L) >60 mL/min/1.73m*2    Calcium 8.5 (L) 8.6 - 10.3 mg/dL    Phosphorus 7.6 (H) 2.5 - 4.9 mg/dL    Albumin 2.8 (L) 3.4 - 5.0 g/dL   Coagulation Screen   Result Value Ref Range    Protime 16.5 (H) 9.8 - 12.8 seconds    INR 1.5 (H) 0.9 - 1.1    aPTT 32 27 - 38 seconds   CBC   Result Value Ref  Range    WBC 6.3 4.4 - 11.3 x10*3/uL    nRBC 0.0 0.0 - 0.0 /100 WBCs    RBC 2.43 (L) 4.50 - 5.90 x10*6/uL    Hemoglobin 7.1 (L) 13.5 - 17.5 g/dL    Hematocrit 22.4 (L) 41.0 - 52.0 %    MCV 92 80 - 100 fL    MCH 29.2 26.0 - 34.0 pg    MCHC 31.7 (L) 32.0 - 36.0 g/dL    RDW 14.8 (H) 11.5 - 14.5 %    Platelets 361 150 - 450 x10*3/uL   Prepare RBC: 1 Units   Result Value Ref Range    PRODUCT CODE H8424Q63     Unit Number [id number]     Unit ABO O     Unit RH NEG     XM INTEP COMP     Dispense Status XM     Blood Expiration Date May 22, 2024 23:59 EDT     PRODUCT BLOOD TYPE 9500     UNIT VOLUME 350           Assessment/Plan     Rai Gant is a 58 y.o. 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 He earlier went for a prostate biopsy the same day with urologist at [facility].  He is currently on the waiting list for a kidney transplant at [facility].       Colonoscopy 5/25/2023: Three 1 to 3 mm polyps in the ascending colon and in the cecum removed with cold snare resected and retrieved.  Diverticulosis in the entire examined colon.  Nonbleeding internal hemorrhoids    Consult for GI bleed.    At the moment of consult patient states that the last bloody bowel movement last night around 8 PM, no new or rectal bleed since then however he feels urge to move his bowels again.  H&H down to 7.1 from 9.4 on arrival, no baseline available.  Most likely patient he has anemia of chronic disease due to his kidney failure, serum iron studies inconsistent with SERGEY.  I agree with medicine that diverticular bleed cannot be completely ruled out at this time, however quiet less likely.  Most likely patient experiences heightened rectal bleed after prostate biopsy.  Discussed with urology who  stated usually post prostate biopsy bleeding resolves only its own which is most often the case after episode of diverticular bleed as well.  In case of prolonged, persistent bleed which would not stop, might consider flex sigmoidoscopy however at this time will choose conservative approach.  Serial CBC, active type and screen on file, 2 large-bore IV access, replenish if below 7.  Daily abdominal exam.  Please report to GI any subsequent bloody bowel movements.  Patient discussed with Dr. Kidd  Will follow    I spent 60 minutes in the professional and overall care of this patient.      Sharon Perales, APRN-CNP

## 2024-04-16 NOTE — H&P
History Of Present Illness  Rai Gant is a 58 y.o. 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  History was taken through  due to him speaking [place].  He states that he felt dizzy while getting off the toilet today.  He earlier went for a prostate biopsy with urologist at [facility] who made him aware that having some blood in his stools was to be expected after undergoing biopsy.  He states that his urologist told him that having blood in his bowel movements can be normal after undergoing a transrectal prostate biopsy.  He denies any abdominal pain, nausea, vomiting, any other recent diarrhea.  He denies any shortness of breath, chest pain, palpitations.  He also denies any hematuria or any urinary symptoms.  He does not make much urine and is a dialysis patient undergoes dialysis Tuesday Thursday and Saturday.  He states himself that he is a non-smoker.  Does not drink alcohol.  He is currently on the waiting list for a kidney transplant at [facility].  He also states that the only two medications he takes are Lopressor and trazodone.    On arrival to the ED, BP 90/62.  HR 83.  RR 20.  97% SpO2 on RA.  Afebrile.  Significant labs showed sodium 133.  Creatinine 8.27.  BUN 63.  Bicarbonate 20.  WCC 15.  Neutrophils 12.  Hemoglobin 9.4 (BL 9-10).  Hematocrit 30 (BL 30-35).  Anion gap 21.  He is being admitted to medicine team for further management of suspected GI bleed.    Colonoscopy 5/25/2023: Three 1 to 3 mm polyps in the ascending colon and in the cecum removed with cold snare resected and retrieved.  Diverticulosis in the entire examined colon.  Nonbleeding internal hemorrhoids    CODE STATUS: Full code    Atulti  used.     Past Medical  "History  As above    Surgical History  As above     Social History  He has no history on file for tobacco use, alcohol use, and drug use.    Family History  No family history on file.     Allergies  Patient has no known allergies.    Review of Systems   A 12 point review of systems was performed and otherwise negative except as stated in the HPI.   Physical Exam  General:  Pleasant and cooperative. No apparent distress.  HEENT:  Normocephalic, atraumatic, mucus membranes moist.   Neck:  Trachea midline.  No JVD.    Chest:  Clear to auscultation bilaterally. No wheezes, rales, or rhonchi.  CV:  Regular rate and rhythm.  Positive S1/S2.   Abdomen: Bowel sounds present in all four quadrants, abdomen is soft, non-tender, non-distended.  Extremities:  No lower extremity edema or cyanosis.   Neurological:  AAOx3. No focal deficits.  Skin:  Warm and dry.   Last Recorded Vitals  Blood pressure 123/58, pulse 89, temperature 35.9 °C (96.6 °F), resp. rate 18, height 1.722 m (5' 7.8\"), weight 64 kg (141 lb 1.5 oz), SpO2 97%.    Relevant Results  All labs and images were reviewed by myself.     Assessment/Plan   Rai Atkins is a 58-year-old male with a PMH of HTN, ESRD on iHD TThS 2/2 ADPCKD, HFmrEF, pAF (not on AC), HLD, BPH, HCV presented to hospital with sudden onset of 7 dark red bloody bowel movements that consisted of large clots followed by a watery consistency.  This all occurred after undergoing prostate biopsy earlier in the day.  He exhibited symptoms of dizziness.  Hemoglobin 9.4 with baseline of 9-10.  Hematocrit 30 (BL 30-35).  He has been admitted to medicine team for further management of suspected GI bleed.    # Suspected GI bleed  # Symptomatic anemia  # Iron deficiency anemia  # BPH s/p transrectal prostate biopsy  # ESRD on HD-T TH S  # Autosomal dominant polycystic kidney disease  - This is a patient presenting with 7 episodes of dark red bloody bowel movements consisting of large clots as described by " him.  This was followed by a watery consistency.  Hemoglobin and hematocrit are at baseline.  Only symptoms are dizziness when he was try to get off the toilet however this has resolved.  Also it can be normal to have blood in in bowel movements after undergoing a transrectal prostate biopsy, the bleeding was quite extensive and it did lead to him feeling dizzy.  Last colonoscopy done in May 2023 showed polyps that were removed and also diverticulosis along the entire colon.  It may be that the patient is having lower GI bleed secondary to diverticulosis.    Plan:  - Hemoglobin is at baseline.  We will continue to monitor H&H.  Will also continue to monitor patient's bowel movements and if any further episodes of blood occur.  We will also type and screen.  If hemoglobin becomes less than 7, we will transfuse PRC.  - ED has consulted GI team for further recommendations.  Dr. Ceballos is advised for inpatient admission for further assessment.  - Avoid NSAIDs, blood thinners.  We will keep patient n.p.o in preparation if any endoscopy is to be performed by GI team.  - We will also give Protonix IV 80 mg once followed by IV 40 mg twice daily for gastric protection.  - Recent iron level on 4/12/2024 showed iron level 22.  Due to recent blood loss from this presentation, we will give IV Venofer.  - Due to patient presenting to ED initially with hypotension with BP 90 systolic, we will hold his home Lopressor 25 twice daily.  Will continue to monitor BP closely however was successfully resuscitated with fluid bolus.    # HFmrEF  # HTN, HLD  # PAF-no AC  # HCV  - Continue home trazodone    - DVT PPx: None        Snow Soto MD  PGY1 internal medicine

## 2024-04-16 NOTE — CONSULTS
Reason For Consult  ESRD    History Of Present Illness  Rai Gant is a 58 y.o. male presenting with rectal bleeding.  He has a history of end-stage renal disease due to polycystic kidney disease and is on dialysis every Tuesday Thursday Saturday.  He last went to dialysis on Saturday.  He speaks primarily Turkmen so most of the history obtained from the chart as well as limited conversation with the patient.  Apparently presented to the hospital yesterday with dark red bloody bowel movements and large blood clots after a recent prostate biopsy.  Hemoglobin initially was 9.4 which dropped to 6.8 today.  He is going to be receiving a blood transfusion.  He makes minimal urine given his dialysis status.  Denies any abdominal pain.  Denies any nausea or vomiting.  Denies any chest pain or shortness of breath.  Has chronic bilateral pleural effusions and had a previous parapneumonic effusion that was treated with a chest tube and antibiotic therapy.  He gets most of his care at the [facility].    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 Currently dialyzing at [facility] under the care of Dr. Ramirez via left arm AVF. He has been on dialysis since 2021. Being worked up for renal transplant at [facility]. She receives mircera and calcitriol with dialysis. Typically receives heparin as well with dialysis. Of note, Hb was 11.7 earlier this month.     Past Medical History  ESRD on HD due to ADPKD; MRA brain in 2024 without overt aneurysm  Nephrolithiasis  Hepatitis C  BPH with elevated PSA  Hypertension with EF 49% and grade 2 diastolic dysfunction  Tubular adenoma in colon, 5/2023  Parapneumonic effusion in January 2024 after COVID.  Had a chest tube and pigtail catheter now removed.  He has chronic bilateral pleural effusions per [facility] documentation    Surgical History  AVF left arm with thrombectomy  L chest tube now removed     Social History  He reports that he has never smoked. He does not have any smokeless  "tobacco history on file. He reports that he does not drink alcohol. No history on file for drug use.    Family History  Brother with PKD     Allergies  Patient has no known allergies.    Medications  Current Outpatient Medications   Medication Instructions    metoprolol tartrate (LOPRESSOR) 25 mg, oral, 2 times daily    traZODone (DESYREL) 50 mg, oral, Nightly        Review of Systems  Please see HPI     Physical Exam  General: No apparent distress  Skin: Somewhat pale  HEENT: No scleral icterus  Neck: No jugular venous distention  Respiratory: Diminished at the bases but otherwise clear  CVS: No rub  Abdomen: Soft  Extremities: Little to no peripheral edema  Neuro: Speaks primarily Australian.  Appears to be alert and oriented         I&O 24HR    Intake/Output Summary (Last 24 hours) at 4/16/2024 1040  Last data filed at 4/16/2024 1005  Gross per 24 hour   Intake 1100 ml   Output --   Net 1100 ml       Vitals 24HR  Heart Rate:  [76-98]   Temp:  [35.9 °C (96.6 °F)-36.8 °C (98.2 °F)]   Resp:  [18-20]   BP: ()/(55-77)   Height:  [172.2 cm (5' 7.8\")-172.7 cm (5' 8\")]   Weight:  [64 kg (141 lb 1.5 oz)]   SpO2:  [94 %-97 %]       Relevant Results  K 5.6, HCO 22, Cr 9.4  Ca 8.5, Phos 7.6  Hb 6.8 from 9.4.  Was greater than 11 at the beginning of the month per outpatient dialysis unit     Assessment/Plan   ESRD on HD TRS due to PKD; usually treats at [facility] [place] [place] [address] has chronic bilateral pleural effusions but does not appear to be volume overloaded  Hyperkalemia due to #1  Acute anemia due to GI bleed after prostate biopsy; history of tubular adenoma in the past. Hb earlier this month was 11.7 currently < 7  Hyperphosphatemia  Hypertension currently with soft Bps in setting of GI bleed    Plan:  HD today with low K bath and UF as tolerated given pulmonary congestion on imaging and need for transfusion. No heparin  Restart phos binders placed on a diet  GI evaluation  Agree with transfusion  Will follow with you in " house    Thanks for involving me in the care of this patient.  If any questions, please do not hesitate to call      Ace Byrd MD

## 2024-04-16 NOTE — PROGRESS NOTES
04/16/24 1453   Discharge Planning   Living Arrangements Spouse/significant other   Support Systems Spouse/significant other   Assistance Needed Independent, patient does drive   Type of Residence Private residence   Home or Post Acute Services None   Patient expects to be discharged to: Home     Met with patient at bedside, introduced self and role on care transitions team. Admission assessment completed with patient. Address and insurance verified. Patient states he goes to dialysis on Tuesday, Thursday and Saturday at [facility] on [address]. Patient confirms plan to return home at discharge. Patient has declined any home going needs.

## 2024-04-16 NOTE — NURSING NOTE
Report to Receiving RN:    Report To: Collette  Time Report Called: 14:16  Hand-Off Communication: tx completed; pt tolerated tx well; 3 liters of fluid removed; vss post tx; no s/s of distress post tx; pt discharged stable back to nursing floor  Complications During Treatment: No  Ultrafiltration Treatment: No  Medications Administered During Dialysis: No  Blood Products Administered During Dialysis: No  Labs Sent During Dialysis: No  Heparin Drip Rate Changes: No  Dialysis Catheter Dressing: N/A  Last Dressing Change: N/A    Electronic Signatures:  Tiffanie Corley RN (Signed CE)     Last Updated: 2:18 PM by TIFFANIE CORLEY

## 2024-04-16 NOTE — NURSING NOTE
Report from Sending RN:    Report From: Collette  Recent Surgery of Procedure: No  Baseline Level of Consciousness (LOC): a/o x3; esl  Oxygen Use: N/A  Type: N/A  Diabetic: Yes  Last BP Med Given Day of Dialysis: See MAR  Last Pain Med Given: See MAR  Lab Tests to be Obtained with Dialysis: N/A  Blood Transfusion to be Given During Dialysis: N/A  Available IV Access: Yes  Medications to be Administered During Dialysis: No  Continuous IV Infusion Running: No  Restraints on Currently or in the Last 24 Hours: No  Hand-Off Communication: full code; stable for hd

## 2024-04-16 NOTE — CARE PLAN
The patient's goals for the shift include      The clinical goals for the shift include comfort    Problem: Pain  Goal: My pain/discomfort is manageable  Outcome: Progressing

## 2024-04-16 NOTE — CARE PLAN
The patient's goals for the shift include      Problem: Pain  Goal: My pain/discomfort is manageable  Outcome: Progressing     Problem: Safety  Goal: Patient will be injury free during hospitalization  Outcome: Progressing     Problem: Daily Care  Goal: Daily care needs are met  Outcome: Progressing     Problem: Psychosocial Needs  Goal: Demonstrates ability to cope with hospitalization/illness  Outcome: Progressing  Goal: Collaborate with me, my family, and caregiver to identify my specific goals  Recent Flowsheet Documentation  Taken 4/15/2024 2222 by Kay Mayes RN  Cultural Requests During Hospitalization: n/a  Spiritual Requests During Hospitalization: n/a

## 2024-04-16 NOTE — PROGRESS NOTES
"Rai Gant is a 58 y.o. male on day 1 of admission presenting with Gastrointestinal hemorrhage, unspecified gastrointestinal hemorrhage type.      Assessment / Plan        Suspected GI bleed  Symptomatic anemia  Iron deficiency anemia  BPH s/p transrectal prostate biopsy  Plan:  -Consulted GI, awaiting recs  -Monitor hemoglobin level  -Avoid NSAIDs, blood thinners  -Continue Protonix twice daily  -Recent iron level on 4/12 showed iron level of 12, was given IV Venofer  -Held home Lopressor 25    ESRD on HD-T TH S  Autosomal dominant polycystic kidney disease  Plan:  -Consulted nephrology, underwent dialysis today    Chronic Medical conditions  HFmrEF  HTN, HLD  PAF-no AC  HCV  Plan:  -Continue home trazodone    DVT ppx: None  IVF: None  Diet: N.p.o.  Consults: GI, nephrology    Care Planning/PT-OT: Not indicated       Alpesh Catalan MD   PGY-1, Internal Medicine  This is a preliminary note, please await attending attestation for final A/P    Subjective     Patient seen and examined. No acute overnight events.  Patient denies any new bloody bowel movements.  Patient was consented in the event that he may need blood.  Patient denies any abdominal pain, chest pain, difficulty breathing, or any other new/acute symptoms      Objective       Physical Exam:  General: No apparent distress  Skin: Somewhat pale  HEENT: No scleral icterus  Neck: No jugular venous distention  Respiratory: Diminished at the bases but otherwise clear  CVS: No rub  Abdomen: Soft  Extremities: Little to no peripheral edema  Neuro: Speaks primarily Persian.  Appears to be alert and oriented       Last Recorded Vitals  Blood pressure 119/74, pulse 99, temperature 35.8 °C (96.4 °F), resp. rate 20, height 1.722 m (5' 7.8\"), weight 64 kg (141 lb 1.5 oz), SpO2 96%.  Intake/Output last 3 Shifts:  I/O last 3 completed shifts:  In: 500 (7.8 mL/kg) [IV Piggyback:500]  Out: - (0 mL/kg)   Weight: 64 kg     Last CBC & BMP  Lab Results   Component Value " Date    GLUCOSE 78 04/16/2024    CALCIUM 8.5 (L) 04/16/2024     (L) 04/16/2024    K 5.6 (H) 04/16/2024    CO2 22 04/16/2024    CL 99 04/16/2024    BUN 70 (H) 04/16/2024    CREATININE 9.42 (H) 04/16/2024     Lab Results   Component Value Date    WBC 6.3 04/16/2024    HGB 7.1 (L) 04/16/2024    HCT 22.4 (L) 04/16/2024    MCV 92 04/16/2024     04/16/2024

## 2024-04-16 NOTE — CONSULTS
"Nutrition Initial Assessment:   Nutrition Assessment    Reason for Assessment: Admission nursing screening    Patient is a 58 y.o. male presenting with suspected GI bleed      Nutrition History:  Food and Nutrient History: Pt remains NPO due to suspected GI bleed.  He was at dialysis today  Food Allergies/Intolerances:  None  GI Symptoms:  suspected GI bleed  Oral Problems: None       Anthropometrics:  Height: 172.2 cm (5' 7.8\")   Weight: 64 kg (141 lb 1.5 oz)   BMI (Calculated): 21.58  IBW/kg (Dietitian Calculated): 70 kg  Percent of IBW: 91 %       Weight History:   Wt Readings from Last 10 Encounters:   04/16/24 64 kg (141 lb 1.5 oz)         Weight Change %:       Nutrition Focused Physical Exam Findings:    Subcutaneous Fat Loss:   Orbital Fat Pads:  (pt was not available)  Muscle Wasting:     Edema:     Physical Findings:       Nutrition Significant Labs:  BMP Trend:   Results from last 7 days   Lab Units 04/16/24  0552 04/15/24  1944   GLUCOSE mg/dL 78 101*   CALCIUM mg/dL 8.5* 9.1   SODIUM mmol/L 135* 133*   POTASSIUM mmol/L 5.6* 5.1   CO2 mmol/L 22 20*   CHLORIDE mmol/L 99 97*   BUN mg/dL 70* 63*   CREATININE mg/dL 9.42* 8.27*        Nutrition Specific Medications:  Protonix; miralax    I/O:    ; Stool Appearance: Other (Comment) (04/16/24 0900)    Dietary Orders (From admission, onward)       Start     Ordered    04/15/24 2252  NPO Diet; Effective now  Diet effective now         04/15/24 2253                     Estimated Needs:      Method for Estimating Needs: 7748-4295    MSJ   calculated for dialysis     Method for Estimating Needs:   1.2-1.5 gm pro kg of IBW  for dialysis     Method for Estimating Needs: 6608-8583    20-30  ml kg of IBW as medically indicated        Nutrition Diagnosis        Nutrition Diagnosis  Patient has Nutrition Diagnosis: Yes  Diagnosis Status (1): Ongoing  Nutrition Diagnosis 1: Increased nutrient needs  Related to (1): physiological causes  As Evidenced by (1): ESRD  " HD       Nutrition Interventions/Recommendations         Nutrition Prescription:  Individualized Nutrition Prescription Provided for : Will follow clinical course for any nutrition intervention needs          Nutrition Education:   N/A       Nutrition Monitoring and Evaluation             Biochemical Data, Medical Tests and Procedures  Monitoring and Evaluation Plan: Electrolyte/renal panel, Glucose/endocrine profile            Time Spent/Follow-up Reminder:   Time Spent (min): 45 minutes  Last Date of Nutrition Visit: 04/16/24  Nutrition Follow-Up Needed?: 3-5 days  Follow up Comment: 4/18

## 2024-04-17 VITALS
WEIGHT: 141.09 LBS | TEMPERATURE: 96.4 F | OXYGEN SATURATION: 99 % | BODY MASS INDEX: 21.38 KG/M2 | RESPIRATION RATE: 20 BRPM | DIASTOLIC BLOOD PRESSURE: 66 MMHG | HEIGHT: 68 IN | HEART RATE: 91 BPM | SYSTOLIC BLOOD PRESSURE: 127 MMHG

## 2024-04-17 LAB
ANION GAP SERPL CALC-SCNC: 14 MMOL/L (ref 10–20)
BUN SERPL-MCNC: 31 MG/DL (ref 6–23)
CALCIUM SERPL-MCNC: 8.9 MG/DL (ref 8.6–10.3)
CHLORIDE SERPL-SCNC: 101 MMOL/L (ref 98–107)
CO2 SERPL-SCNC: 29 MMOL/L (ref 21–32)
CREAT SERPL-MCNC: 4.97 MG/DL (ref 0.5–1.3)
EGFRCR SERPLBLD CKD-EPI 2021: 13 ML/MIN/1.73M*2
ERYTHROCYTE [DISTWIDTH] IN BLOOD BY AUTOMATED COUNT: 14.6 % (ref 11.5–14.5)
GLUCOSE SERPL-MCNC: 79 MG/DL (ref 74–99)
HCT VFR BLD AUTO: 22.3 % (ref 41–52)
HGB BLD-MCNC: 7.2 G/DL (ref 13.5–17.5)
MAGNESIUM SERPL-MCNC: 1.93 MG/DL (ref 1.6–2.4)
MCH RBC QN AUTO: 29.6 PG (ref 26–34)
MCHC RBC AUTO-ENTMCNC: 32.3 G/DL (ref 32–36)
MCV RBC AUTO: 92 FL (ref 80–100)
NRBC BLD-RTO: 0 /100 WBCS (ref 0–0)
PLATELET # BLD AUTO: 387 X10*3/UL (ref 150–450)
POTASSIUM SERPL-SCNC: 5.5 MMOL/L (ref 3.5–5.3)
RBC # BLD AUTO: 2.43 X10*6/UL (ref 4.5–5.9)
SODIUM SERPL-SCNC: 138 MMOL/L (ref 136–145)
WBC # BLD AUTO: 5.2 X10*3/UL (ref 4.4–11.3)

## 2024-04-17 PROCEDURE — 83735 ASSAY OF MAGNESIUM: CPT

## 2024-04-17 PROCEDURE — 80048 BASIC METABOLIC PNL TOTAL CA: CPT

## 2024-04-17 PROCEDURE — 85027 COMPLETE CBC AUTOMATED: CPT

## 2024-04-17 PROCEDURE — C9113 INJ PANTOPRAZOLE SODIUM, VIA: HCPCS

## 2024-04-17 PROCEDURE — 2500000004 HC RX 250 GENERAL PHARMACY W/ HCPCS (ALT 636 FOR OP/ED)

## 2024-04-17 PROCEDURE — 36415 COLL VENOUS BLD VENIPUNCTURE: CPT

## 2024-04-17 RX ADMIN — PANTOPRAZOLE SODIUM 40 MG: 40 INJECTION, POWDER, FOR SOLUTION INTRAVENOUS at 08:45

## 2024-04-17 RX ADMIN — POLYETHYLENE GLYCOL 3350 17 G: 17 POWDER, FOR SOLUTION ORAL at 08:53

## 2024-04-17 RX ADMIN — IRON SUCROSE 200 MG: 20 INJECTION, SOLUTION INTRAVENOUS at 14:29

## 2024-04-17 ASSESSMENT — PAIN SCALES - GENERAL: PAINLEVEL_OUTOF10: 0 - NO PAIN

## 2024-04-17 NOTE — CARE PLAN
Problem: Pain  Goal: My pain/discomfort is manageable  Outcome: Progressing   The patient's goals for the shift include      The clinical goals for the shift include comfort and suport

## 2024-04-17 NOTE — DISCHARGE SUMMARY
Discharge Diagnosis  GI bleed following transrectal prostate biopsy   Symptomatic anemia  Iron deficiency anemia  BPH s/p transrectal prostate biopsy    Issues Requiring Follow-Up  Follow-up with your primary care provider regarding biopsy results  Follow-up with your kidney doctor regarding possible transplant/dialysis    Discharge Meds     Your medication list        ASK your doctor about these medications        Instructions Last Dose Given Next Dose Due   metoprolol tartrate 25 mg tablet  Commonly known as: Lopressor           traZODone 50 mg tablet  Commonly known as: Desyrel                    Test Results Pending At Discharge  Pending Labs       No current pending labs.            Hospital Course   Rai Gant is a 58 y.o. 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  History was taken through  due to him speaking Brazilian.  He states that he felt dizzy while getting off the toilet today.  He earlier went for a prostate biopsy with urologist at [facility] who made him aware that having some blood in his stools was to be expected after undergoing biopsy.  He states that his urologist told him that having blood in his bowel movements can be normal after undergoing a transrectal prostate biopsy.  Patient presented to the hospital regarding concern over these bowel movements.  Patient sees to have bloody bowel movements during course of hospitalization.  GI was consulted but did not believe that patient would need any scope at this point.  Patient was given IV Venofer.  Patient did have dip in hemoglobin but did not reach that point which would require transfusion and maintained a stable blood level.  Patient was deemed medically stable for discharge on  4/17/2024    Pertinent Physical Exam At Time of Discharge  Physical Exam  General: No apparent distress  HEENT: No scleral icterus  Neck: No jugular venous distention  Respiratory: Diminished at the bases but otherwise clear  CVS: No rub  Abdomen: Soft  Extremities: Little to no peripheral edema  Neuro: Speaks primarily Macedonian.  Appears to be alert and oriented    Outpatient Follow-Up  No future appointments.      Alpesh Catalan MD

## 2024-04-17 NOTE — DOCUMENTATION CLARIFICATION NOTE
"    PATIENT:               MILKA ROOT  ACCT #:                  [id number]  MRN:                       [id number]  :                       1965  ADMIT DATE:       4/15/2024 7:15 PM  DISCH DATE:  RESPONDING PROVIDER #:        [id number]          PROVIDER RESPONSE TEXT:    Coagulopathy due to atrial fibrillation    CDI QUERY TEXT:    Clarification        Instruction:    Based on your assessment of the patient and the clinical information, please provide the requested documentation by clicking on the appropriate radio button and enter any additional information if prompted.    Question: Is there a diagnosis indicative of the clinical findings and patient symptoms    When answering this query, please exercise your independent professional judgment. The fact that a question is being asked, does not imply that any particular answer is desired or expected.    The patient's clinical indicators include:  Clinical Information: 4/15/24 H/P Dr Soto \"58 y.o. male with a PMH of HTN, ESRD on iHD, HFmrEF, pAF not on AC, HLD, BPH, HCV, anemia and tobacco use, presented to the hospital after having about 7 dark red bloody bowel movements which she characterized as large blood clots followed by a watery consistency. This all occurred after undergoing prostate biopsy earlier in the day.\" Diagnosis Suspected GI bleed, symptomatic anemia, iron deficiency anemia, BPH s/p transrectal prostrate biopsy, ESRD on HD, Autosomal dominant polycystic kidney disease, PAF-no AC.    Clinical Indicators:  1. 24 Gastroenterology \" Most likely patient experiences heightened rectal bleed after prostate biopsy. \"  2. 24 Nephrology \"HD today with low K bath and UF as tolerated given pulmonary congestion on imaging and need for  transfusion. No heparin.\" Diagnosis Acute anemia due to GI bleed after prostate biopsy; history of tubular adenoma in the past.  Hb earlier this month was 11.7 currently < 7.  3. ED Gastrointestinal " hemorrhage      Treatment: IVF, Transfusion PRBC's, Venofer IV, Protonix IV    Risk Factors: Atrial fibrillation, presented to the hospital after having about 7 dark red bloody bowel movements which she characterized as large blood clots followed by a watery consistency. This all occurred after undergoing prostate biopsy earlier in the day.  Options provided:  -- Coagulopathy due to atrial fibrillation  -- Coagulopathy due to medication administration  -- Other - I will add my own diagnosis  -- Refer to Clinical Documentation Reviewer    Query created by: Jas Gaona on 4/17/2024 10:52 AM      Electronically signed by:  VENITA MANZO MD 4/17/2024 3:02 PM

## 2024-04-17 NOTE — PROGRESS NOTES
"Rai Gant is a 58 y.o. male on day 2 of admission presenting with Gastrointestinal hemorrhage, unspecified gastrointestinal hemorrhage type.      Subjective     Reports feeling well.  He denies any nausea or vomiting.  Objective     Reports dialysis went well yesterday.       Vitals 24HR  Heart Rate:  []   Temp:  [35.8 °C (96.4 °F)-36.4 °C (97.5 °F)]   Resp:  [20]   BP: (112-121)/(64-74)   Height:  [172.2 cm (5' 7.8\")]   Weight:  [64 kg (141 lb 1.5 oz)]   SpO2:  [93 %-97 %]         Intake/Output last 3 Shifts:    Intake/Output Summary (Last 24 hours) at 4/17/2024 1124  Last data filed at 4/16/2024 1305  Gross per 24 hour   Intake 0 ml   Output --   Net 0 ml       Physical Exam    Patient has a left upper arm AV fistula.  Good thrill and bruit.  No surrounding erythema or warmth.  Lungs are clear.  There is no cardiac rub.  No peripheral edema.    Assessment/Plan     Patient with end-stage kidney disease on Tuesday, Thursday Saturday dialysis.  Patient is adequate dialyzed.  AV fistula is working well.    Overall appears euvolemic on exam with excellent blood pressures.    On erythropoietin for anemia.    Patient to be discharged and have outpatient dialysis tomorrow to his chronic unit.  Roscoe Bhat MD      "

## 2024-04-19 LAB
BLOOD EXPIRATION DATE: NORMAL
DISPENSE STATUS: NORMAL
PRODUCT BLOOD TYPE: 9500
PRODUCT CODE: NORMAL
UNIT ABO: NORMAL
UNIT NUMBER: NORMAL
UNIT RH: NORMAL
UNIT VOLUME: 350
XM INTEP: NORMAL

## 2024-04-26 ENCOUNTER — HOSPITAL ENCOUNTER (EMERGENCY)
Facility: HOSPITAL | Age: 59
Discharge: LEFT BEFORE DISCHARGE | End: 2024-04-26
Attending: STUDENT IN AN ORGANIZED HEALTH CARE EDUCATION/TRAINING PROGRAM
Payer: MEDICARE

## 2024-04-26 ENCOUNTER — APPOINTMENT (OUTPATIENT)
Dept: CARDIOLOGY | Facility: HOSPITAL | Age: 59
End: 2024-04-26
Payer: MEDICARE

## 2024-04-26 VITALS
TEMPERATURE: 98.2 F | WEIGHT: 138.89 LBS | RESPIRATION RATE: 16 BRPM | DIASTOLIC BLOOD PRESSURE: 70 MMHG | OXYGEN SATURATION: 97 % | BODY MASS INDEX: 21.05 KG/M2 | HEART RATE: 90 BPM | HEIGHT: 68 IN | SYSTOLIC BLOOD PRESSURE: 150 MMHG

## 2024-04-26 DIAGNOSIS — D64.9 ANEMIA, UNSPECIFIED TYPE: Primary | ICD-10-CM

## 2024-04-26 DIAGNOSIS — E87.5 HYPERKALEMIA: ICD-10-CM

## 2024-04-26 LAB
ABO GROUP (TYPE) IN BLOOD: NORMAL
ALBUMIN SERPL BCP-MCNC: 3.8 G/DL (ref 3.4–5)
ALP SERPL-CCNC: 33 U/L (ref 33–120)
ALT SERPL W P-5'-P-CCNC: 6 U/L (ref 10–52)
ANION GAP SERPL CALC-SCNC: 16 MMOL/L (ref 10–20)
ANTIBODY SCREEN: NORMAL
AST SERPL W P-5'-P-CCNC: 12 U/L (ref 9–39)
BASOPHILS # BLD AUTO: 0.12 X10*3/UL (ref 0–0.1)
BASOPHILS NFR BLD AUTO: 2.1 %
BILIRUB SERPL-MCNC: 0.3 MG/DL (ref 0–1.2)
BUN SERPL-MCNC: 49 MG/DL (ref 6–23)
CALCIUM SERPL-MCNC: 10.1 MG/DL (ref 8.6–10.3)
CHLORIDE SERPL-SCNC: 98 MMOL/L (ref 98–107)
CO2 SERPL-SCNC: 28 MMOL/L (ref 21–32)
CREAT SERPL-MCNC: 6.1 MG/DL (ref 0.5–1.3)
EGFRCR SERPLBLD CKD-EPI 2021: 10 ML/MIN/1.73M*2
EOSINOPHIL # BLD AUTO: 0.11 X10*3/UL (ref 0–0.7)
EOSINOPHIL NFR BLD AUTO: 1.9 %
ERYTHROCYTE [DISTWIDTH] IN BLOOD BY AUTOMATED COUNT: 17.2 % (ref 11.5–14.5)
GLUCOSE BLD MANUAL STRIP-MCNC: 56 MG/DL (ref 74–99)
GLUCOSE SERPL-MCNC: 111 MG/DL (ref 74–99)
HCT VFR BLD AUTO: 28 % (ref 41–52)
HGB BLD-MCNC: 8.5 G/DL (ref 13.5–17.5)
IMM GRANULOCYTES # BLD AUTO: 0.02 X10*3/UL (ref 0–0.7)
IMM GRANULOCYTES NFR BLD AUTO: 0.3 % (ref 0–0.9)
LYMPHOCYTES # BLD AUTO: 1.45 X10*3/UL (ref 1.2–4.8)
LYMPHOCYTES NFR BLD AUTO: 25.3 %
MAGNESIUM SERPL-MCNC: 2.22 MG/DL (ref 1.6–2.4)
MCH RBC QN AUTO: 29.7 PG (ref 26–34)
MCHC RBC AUTO-ENTMCNC: 30.4 G/DL (ref 32–36)
MCV RBC AUTO: 98 FL (ref 80–100)
MONOCYTES # BLD AUTO: 0.52 X10*3/UL (ref 0.1–1)
MONOCYTES NFR BLD AUTO: 9.1 %
NEUTROPHILS # BLD AUTO: 3.52 X10*3/UL (ref 1.2–7.7)
NEUTROPHILS NFR BLD AUTO: 61.3 %
NRBC BLD-RTO: 0 /100 WBCS (ref 0–0)
PLATELET # BLD AUTO: 446 X10*3/UL (ref 150–450)
POTASSIUM SERPL-SCNC: 5 MMOL/L (ref 3.5–5.3)
POTASSIUM SERPL-SCNC: 5.9 MMOL/L (ref 3.5–5.3)
PROT SERPL-MCNC: 7.5 G/DL (ref 6.4–8.2)
RBC # BLD AUTO: 2.86 X10*6/UL (ref 4.5–5.9)
RH FACTOR (ANTIGEN D): NORMAL
SODIUM SERPL-SCNC: 136 MMOL/L (ref 136–145)
WBC # BLD AUTO: 5.7 X10*3/UL (ref 4.4–11.3)

## 2024-04-26 PROCEDURE — 2500000002 HC RX 250 W HCPCS SELF ADMINISTERED DRUGS (ALT 637 FOR MEDICARE OP, ALT 636 FOR OP/ED): Performed by: STUDENT IN AN ORGANIZED HEALTH CARE EDUCATION/TRAINING PROGRAM

## 2024-04-26 PROCEDURE — 93005 ELECTROCARDIOGRAM TRACING: CPT

## 2024-04-26 PROCEDURE — 2500000005 HC RX 250 GENERAL PHARMACY W/O HCPCS: Performed by: STUDENT IN AN ORGANIZED HEALTH CARE EDUCATION/TRAINING PROGRAM

## 2024-04-26 PROCEDURE — 2500000004 HC RX 250 GENERAL PHARMACY W/ HCPCS (ALT 636 FOR OP/ED): Performed by: STUDENT IN AN ORGANIZED HEALTH CARE EDUCATION/TRAINING PROGRAM

## 2024-04-26 PROCEDURE — 36415 COLL VENOUS BLD VENIPUNCTURE: CPT | Performed by: STUDENT IN AN ORGANIZED HEALTH CARE EDUCATION/TRAINING PROGRAM

## 2024-04-26 PROCEDURE — 85025 COMPLETE CBC W/AUTO DIFF WBC: CPT | Performed by: NURSE PRACTITIONER

## 2024-04-26 PROCEDURE — 82947 ASSAY GLUCOSE BLOOD QUANT: CPT | Mod: 59

## 2024-04-26 PROCEDURE — 96375 TX/PRO/DX INJ NEW DRUG ADDON: CPT

## 2024-04-26 PROCEDURE — 84075 ASSAY ALKALINE PHOSPHATASE: CPT | Performed by: NURSE PRACTITIONER

## 2024-04-26 PROCEDURE — 83735 ASSAY OF MAGNESIUM: CPT | Performed by: NURSE PRACTITIONER

## 2024-04-26 PROCEDURE — 2500000006 HC RX 250 W HCPCS SELF ADMINISTERED DRUGS (ALT 637 FOR ALL PAYERS): Performed by: STUDENT IN AN ORGANIZED HEALTH CARE EDUCATION/TRAINING PROGRAM

## 2024-04-26 PROCEDURE — 84132 ASSAY OF SERUM POTASSIUM: CPT | Mod: 59 | Performed by: STUDENT IN AN ORGANIZED HEALTH CARE EDUCATION/TRAINING PROGRAM

## 2024-04-26 PROCEDURE — 36415 COLL VENOUS BLD VENIPUNCTURE: CPT | Performed by: NURSE PRACTITIONER

## 2024-04-26 PROCEDURE — 99285 EMERGENCY DEPT VISIT HI MDM: CPT | Mod: 25

## 2024-04-26 PROCEDURE — 86901 BLOOD TYPING SEROLOGIC RH(D): CPT | Performed by: NURSE PRACTITIONER

## 2024-04-26 PROCEDURE — 96374 THER/PROPH/DIAG INJ IV PUSH: CPT

## 2024-04-26 PROCEDURE — 94640 AIRWAY INHALATION TREATMENT: CPT

## 2024-04-26 RX ORDER — ALBUTEROL SULFATE 0.83 MG/ML
2.5 SOLUTION RESPIRATORY (INHALATION) ONCE
Status: COMPLETED | OUTPATIENT
Start: 2024-04-26 | End: 2024-04-26

## 2024-04-26 RX ORDER — DEXTROSE 50 % IN WATER (D50W) INTRAVENOUS SYRINGE
25 ONCE
Status: COMPLETED | OUTPATIENT
Start: 2024-04-26 | End: 2024-04-26

## 2024-04-26 RX ORDER — ALBUTEROL SULFATE 5 MG/ML
10 SOLUTION RESPIRATORY (INHALATION) ONCE
Status: DISCONTINUED | OUTPATIENT
Start: 2024-04-26 | End: 2024-04-26 | Stop reason: WASHOUT

## 2024-04-26 RX ORDER — ALBUTEROL SULFATE 2.5 MG/.5ML
2.5 SOLUTION RESPIRATORY (INHALATION) ONCE
Status: COMPLETED | OUTPATIENT
Start: 2024-04-26 | End: 2024-04-26

## 2024-04-26 RX ORDER — FUROSEMIDE 10 MG/ML
20 INJECTION INTRAMUSCULAR; INTRAVENOUS ONCE
Status: COMPLETED | OUTPATIENT
Start: 2024-04-26 | End: 2024-04-26

## 2024-04-26 RX ADMIN — FUROSEMIDE 20 MG: 10 INJECTION, SOLUTION INTRAMUSCULAR; INTRAVENOUS at 15:23

## 2024-04-26 RX ADMIN — ALBUTEROL SULFATE 2.5 MG: 2.5 SOLUTION RESPIRATORY (INHALATION) at 15:15

## 2024-04-26 RX ADMIN — ALBUTEROL SULFATE 2.5 MG: 2.5 SOLUTION RESPIRATORY (INHALATION) at 15:07

## 2024-04-26 RX ADMIN — DEXTROSE MONOHYDRATE 25 G: 25 INJECTION, SOLUTION INTRAVENOUS at 15:22

## 2024-04-26 RX ADMIN — SODIUM ZIRCONIUM CYCLOSILICATE 10 G: 10 POWDER, FOR SUSPENSION ORAL at 15:22

## 2024-04-26 RX ADMIN — ALBUTEROL SULFATE 2.5 MG: 2.5 SOLUTION RESPIRATORY (INHALATION) at 15:10

## 2024-04-26 RX ADMIN — INSULIN HUMAN 5 UNITS: 100 INJECTION, SOLUTION PARENTERAL at 15:22

## 2024-04-26 ASSESSMENT — COLUMBIA-SUICIDE SEVERITY RATING SCALE - C-SSRS
6. HAVE YOU EVER DONE ANYTHING, STARTED TO DO ANYTHING, OR PREPARED TO DO ANYTHING TO END YOUR LIFE?: NO
2. HAVE YOU ACTUALLY HAD ANY THOUGHTS OF KILLING YOURSELF?: NO
1. IN THE PAST MONTH, HAVE YOU WISHED YOU WERE DEAD OR WISHED YOU COULD GO TO SLEEP AND NOT WAKE UP?: NO

## 2024-04-26 NOTE — ED PROVIDER NOTES
EMERGENCY DEPARTMENT ENCOUNTER      Pt Name: Rai Gant  MRN: [id number]  Birthdate 1965  Date of evaluation: 4/26/2024  Provider: Vic Riggs DO    CHIEF COMPLAINT       Chief Complaint   Patient presents with    pt sent in for abnormal labs        HISTORY OF PRESENT ILLNESS    Rai Gant is a 59 y.o. male who presents to the emergency department with Himself for reported abnormal lab work.  He was told by his primary physician to report to the emergency department as his hemoglobin levels were low.  Of note he was recently in the hospital 4/15/24 for low blood counts concern for bloody stools.  GI did not scope him at that time.  He is chronically on hemodialysis does make urine.  Last hemodialysis was this past Thursday.  He otherwise feels fairly well at this time.  Asymptomatic.            Nursing Notes were reviewed.    REVIEW OF SYSTEMS     CONSTITUTIONAL: Endorses abnormal labs.  Denies fever, sweats, chills.   NEURO: Denies difficulty walking, numbness, weakness, tingling, headache.   HEENT: Denies sore throat, rhinorrhea, changes in vision.   CARDIO: Denies chest pain, palpitations.  PULM: Denies shortness of breath, cough.   GI: Denies abdominal pain, nausea, vomiting, diarrhea, constipation, melena, hematochezia.  : Denies painful urination, frequency, hematuria.   MSK: Denies recent trauma.   SKIN: Denies rash, lesions.   ENDOCRINE: Denies unexpected weight-loss.   HEME: Denies bleeding disorder.     PAST MEDICAL HISTORY   No past medical history on file.  GI bleed, hemodialysis Tuesday Thursday Saturday.  SURGICAL HISTORY     No past surgical history on file.    ALLERGIES     Patient has no known allergies.    FAMILY HISTORY     No family history on file.     SOCIAL HISTORY       Social History     Socioeconomic History    Marital status:      Spouse name: Not on file    Number of children: Not on file    Years of education: Not on file    Highest education level: Not on  file   Occupational History    Not on file   Tobacco Use    Smoking status: Never    Smokeless tobacco: Not on file   Substance and Sexual Activity    Alcohol use: Never    Drug use: Not on file    Sexual activity: Not on file   Other Topics Concern    Not on file   Social History Narrative    Not on file     Social Determinants of Health     Financial Resource Strain: Patient Declined (4/15/2024)    Overall Financial Resource Strain (CARDIA)     Difficulty of Paying Living Expenses: Patient declined   Food Insecurity: No Food Insecurity (1/2/2024)    Received from [facility] Vital Sign     Worried About Running Out of Food in the Last Year: Never true     Ran Out of Food in the Last Year: Never true   Transportation Needs: Patient Declined (4/15/2024)    PRAPARE - Transportation     Lack of Transportation (Medical): Patient declined     Lack of Transportation (Non-Medical): Patient declined   Physical Activity: Not on file   Stress: Not on file   Social Connections: Not on file   Intimate Partner Violence: Not on file   Housing Stability: Patient Declined (4/15/2024)    Housing Stability Vital Sign     Unable to Pay for Housing in the Last Year: Patient declined     Number of Places Lived in the Last Year: 1     Unstable Housing in the Last Year: Patient declined       PHYSICAL EXAM   VS: As documented in the triage note from today's date and EMR flowsheet were reviewed.  Gen: Well developed. No acute distress. Seated in bed. Appears nontoxic.   Skin: Warm. Dry. Intact. No rashes or lesions.  Fistula over the left upper extremity good thrill.  Eyes: Pupils equally round and reactive to light. Clear sclera.   HENT: Atraumatic appearance. Mucosal membranes moist. No oral lesions, uvula midline, airway patent.   CV: Regular rate and regular rhythm. S1, S2. No pedal edema. Warm extremities.  Resp: Nonlabored breathing Clear to auscultation bilaterally. No increased work of breathing.   GI: Soft and  nontender. No rebound or guarding. Bowel sounds x4 present.  De Los Santos sign McBurney's point tenderness is negative no CVA tenderness.  MSK: Symmetric muscle bulk. No joint swelling in the extremities. Compartments are soft. Neurovascularly intact x4 extremities. Radial pulses +2 equal bilaterally.  Pedal pulses +2 equal bilaterally.  Neuro: Alert. Speech fluent. Moving all extremities. No focal deficits. Gait normal.  Psych: Appropriate. Kempt.    DIAGNOSTIC RESULTS   RADIOLOGY:     No orders to display         ED BEDSIDE ULTRASOUND:   Performed by ED Physician - none    LABS:  Labs Reviewed   CBC WITH AUTO DIFFERENTIAL - Abnormal       Result Value    WBC 5.7      nRBC 0.0      RBC 2.86 (*)     Hemoglobin 8.5 (*)     Hematocrit 28.0 (*)     MCV 98      MCH 29.7      MCHC 30.4 (*)     RDW 17.2 (*)     Platelets 446      Neutrophils % 61.3      Immature Granulocytes %, Automated 0.3      Lymphocytes % 25.3      Monocytes % 9.1      Eosinophils % 1.9      Basophils % 2.1      Neutrophils Absolute 3.52      Immature Granulocytes Absolute, Automated 0.02      Lymphocytes Absolute 1.45      Monocytes Absolute 0.52      Eosinophils Absolute 0.11      Basophils Absolute 0.12 (*)    COMPREHENSIVE METABOLIC PANEL - Abnormal    Glucose 111 (*)     Sodium 136      Potassium 5.9 (*)     Chloride 98      Bicarbonate 28      Anion Gap 16      Urea Nitrogen 49 (*)     Creatinine 6.10 (*)     eGFR 10 (*)     Calcium 10.1      Albumin 3.8      Alkaline Phosphatase 33      Total Protein 7.5      AST 12      Bilirubin, Total 0.3      ALT 6 (*)    POCT GLUCOSE - Abnormal    POCT Glucose 56 (*)    MAGNESIUM - Normal    Magnesium 2.22     POTASSIUM - Normal    Potassium 5.0     TYPE AND SCREEN    ABO TYPE O      Rh TYPE NEG      ANTIBODY SCREEN NEG     POCT GLUCOSE METER       All other labs were within normal range or not returned as of this dictation.    EMERGENCY DEPARTMENT COURSE/MDM:   Vitals:    Vitals:    04/26/24 1117 04/26/24 1650  "  BP: 154/78 150/70   Pulse: 93 90   Resp: 16 16   Temp: 36.8 °C (98.2 °F)    SpO2: 97% 97%   Weight: 63 kg (138 lb 14.2 oz)    Height: 1.727 m (5' 8\")        I reviewed the patient's triage vitals and they are hypertensive recommend follow-up with primary physician for repeat checks.    Due to the above findings the following was ordered basic labs to include urinalysis.    Patient arrives asymptomatic although he was directed to the emergency department due to reported anemia.  Lab work is drawn his hemoglobin is actually improved from baseline he denies any bleeding he remains hemodynamically stable.  Incidentally he was found to be hyperkalemic I suspect this is related to his chronic ESRD.  Patient is given hyperkalemic cocktail repeat potassium is pending he has no EKG changes consistent with hyperkalemia.  He was also found to be hypoglycemic after the insulin therefore was encouraged p.o. intake.  He was signed out to the oncoming physician to follow-up on repeat potassium level I do feel that he can likely be discharged if this is downtrending as he has hemodialysis tomorrow.    ED Course as of 04/26/24 2207 Fri Apr 26, 2024   1532 Interpreted by the Emergency Department Attending: ECG revealed normal sinus rhythm at a rate of 87 beats per minute with MO interval 150 , QRS of 86 , QTc of 413.  No acute injury pattern.  No previous EKG to compare. [MG]      ED Course User Index  [MG] Vic Riggs DO         Diagnoses as of 04/26/24 2207   Anemia, unspecified type   Hyperkalemia       Patient was counseled regarding labs, imaging, likely diagnosis, and plan. All questions were answered.     ------------------------------------------------------------------  Information provided by the patient  Past medical history complicating workup hemodialysis patient.  Previous medical records reviewed recent hospital admission 4/15/2024  Considered admission to the hospital although if potassium downtrending feel " is reasonable for discharge.  ------------------------------------------------------------------  ED Medications administered this visit:    Medications   dextrose 50 % injection 25 g (25 g intravenous Given 4/26/24 1522)   insulin regular (HumuLIN R,NovoLIN R) injection 5 Units (5 Units intravenous Given 4/26/24 1522)   furosemide (Lasix) injection 20 mg (20 mg intravenous Given 4/26/24 1523)   sodium zirconium cyclosilicate (Lokelma) packet 10 g (10 g oral Given 4/26/24 1522)   albuterol nebulizer solution 2.5 mg (2.5 mg nebulization Given 4/26/24 1515)   albuterol 2.5 mg /3 mL (0.083 %) nebulizer solution 2.5 mg (2.5 mg nebulization Given 4/26/24 1507)   albuterol 2.5 mg /3 mL (0.083 %) nebulizer solution 2.5 mg (2.5 mg nebulization Given 4/26/24 1510)   albuterol 2.5 mg /3 mL (0.083 %) nebulizer solution 2.5 mg (2.5 mg nebulization Given 4/26/24 1510)       New Prescriptions from this visit:    Discharge Medication List as of 4/26/2024  5:15 PM          Follow-up:  Gabe Caldwell DO  [address] [facility] [address]  [phone]    Schedule an appointment as soon as possible for a visit in 1 day      [facility] Emergency Medicine  [address] [facility] [address] [phone]  Go to   If symptoms worsen        Final Impression:   1. Anemia, unspecified type    2. Hyperkalemia          Vic Riggs DO    (Please note that portions of this note were completed with a voice recognition program.  Efforts were made to edit the dictations but occasionally words are mis-transcribed.)     Vic Riggs DO  04/26/24 3385

## 2024-04-26 NOTE — PROGRESS NOTES
"Transition of care note:  Patient was turned over to me from prior provider.  Patient had a repeat potassium pending.  He did not want to stay he wants to be discharged home.  His initial potassium was 5.9.  He is a dialysis patient that gets dialysis Tuesday, Thursday, Saturday he does do dialysis tomorrow.  His lab is still pending.  Patient will sign out AGAINST MEDICAL ADVICE.    ED Course as of 04/26/24 1646   Fri Apr 26, 2024   1532 Interpreted by the Emergency Department Attending: ECG revealed normal sinus rhythm at a rate of 87 beats per minute with NM interval 150 , QRS of 86 , QTc of 413.  No acute injury pattern.  No previous EKG to compare. [MG]      ED Course User Index  [MG] Vic Riggs,          Diagnoses as of 04/26/24 1646   Anemia, unspecified type   Hyperkalemia      Visit Vitals  /78   Pulse 93   Temp 36.8 °C (98.2 °F)   Resp 16   Ht 1.727 m (5' 8\")   Wt 63 kg (138 lb 14.2 oz)   SpO2 97%   BMI 21.12 kg/m²   Smoking Status Never   BSA 1.74 m²      1. Anemia, unspecified type    2. Hyperkalemia        "

## 2024-04-26 NOTE — DISCHARGE INSTRUCTIONS
As discussed your anemia has improved significantly continue to monitor your stools for any bloody or tarry stools if these occur return immediately.  You are found to be hyperkalemic or high potassium levels please do not miss your dialysis session tomorrow morning.  You were treated for your hyperkalemia while in the emergency department no indication for admission at this time.  You remained asymptomatic.  Should you begin experiencing any additional symptoms concerning to call 911 or return to the nearest emergency department.

## 2024-04-26 NOTE — ED TRIAGE NOTES
This patient was seen and examined in triage    HPI:  Patient is a healthy nontoxic-appearing 59-year-old male with past medical history of GI hemorrhage, presents to the emergency today for complaint of abnormal lab work.  Patient states he received a phone call from his primary care provider encourage him to go to emergency room for further evaluation of low H&H.  Patient states he was recently discharged in the hospital for GI bleed recently.  Patient has no complaints in triage and denies any abdominal pain, nausea, vomiting, diarrhea or constipation, chest pain, shortness of breath difficulty breathing, fatigue, malaise, fever, shaking, or chills.    Focused PE:  Gen: Well-appearing, not in acute distress  Cardiovascular: Regular rate, normal rhythm, no murmur, no gallop  Respiratory: No adventitious lung sounds auscultated.  Abdomen: No reproducible abdominal tenderness upon palpation,  physical exam may be limited by patient positioning sitting up in a chair  Neuro:  Alert and Oriented, speech clear and coherent    Plan:  Lab work ordered from triage.    For the remainder the patient's work-up and ED course, please see the main ED provider note.  We discussed need for diagnostic testing including lab studies and imaging.  We also discussed that they may be asked to wait in the waiting room while these test are pending.  They understand that if they choose to leave without having the testing completed or resulted that we cannot rule out acute life-threatening illnesses and the risks involved to lead to worsening condition, permanent disability or even death.

## 2024-05-03 LAB
ATRIAL RATE: 87 BPM
P AXIS: 50 DEGREES
P OFFSET: 194 MS
P ONSET: 144 MS
PR INTERVAL: 150 MS
Q ONSET: 219 MS
QRS COUNT: 14 BEATS
QRS DURATION: 86 MS
QT INTERVAL: 344 MS
QTC CALCULATION(BAZETT): 413 MS
QTC FREDERICIA: 389 MS
R AXIS: 67 DEGREES
T AXIS: 76 DEGREES
T OFFSET: 391 MS
VENTRICULAR RATE: 87 BPM

## 2025-01-28 ENCOUNTER — APPOINTMENT (OUTPATIENT)
Dept: RADIOLOGY | Facility: HOSPITAL | Age: 60
End: 2025-01-28
Payer: MEDICARE

## 2025-01-28 ENCOUNTER — HOSPITAL ENCOUNTER (EMERGENCY)
Facility: HOSPITAL | Age: 60
Discharge: LEFT BEFORE DISCHARGE | End: 2025-01-28
Payer: MEDICARE

## 2025-01-28 VITALS
RESPIRATION RATE: 16 BRPM | DIASTOLIC BLOOD PRESSURE: 74 MMHG | TEMPERATURE: 97.7 F | SYSTOLIC BLOOD PRESSURE: 160 MMHG | BODY MASS INDEX: 21.72 KG/M2 | HEIGHT: 68 IN | OXYGEN SATURATION: 94 % | WEIGHT: 143.3 LBS | HEART RATE: 74 BPM

## 2025-01-28 LAB
ALBUMIN SERPL BCP-MCNC: 4.2 G/DL (ref 3.4–5)
ALP SERPL-CCNC: 53 U/L (ref 33–120)
ALT SERPL W P-5'-P-CCNC: 5 U/L (ref 10–52)
ANION GAP SERPL CALC-SCNC: 19 MMOL/L (ref 10–20)
AST SERPL W P-5'-P-CCNC: 13 U/L (ref 9–39)
BASOPHILS # BLD AUTO: 0.1 X10*3/UL (ref 0–0.1)
BASOPHILS NFR BLD AUTO: 1.8 %
BILIRUB SERPL-MCNC: 0.5 MG/DL (ref 0–1.2)
BNP SERPL-MCNC: 1064 PG/ML (ref 0–99)
BUN SERPL-MCNC: 74 MG/DL (ref 6–23)
CALCIUM SERPL-MCNC: 9.6 MG/DL (ref 8.6–10.3)
CHLORIDE SERPL-SCNC: 94 MMOL/L (ref 98–107)
CO2 SERPL-SCNC: 29 MMOL/L (ref 21–32)
CREAT SERPL-MCNC: 9.88 MG/DL (ref 0.5–1.3)
EGFRCR SERPLBLD CKD-EPI 2021: 6 ML/MIN/1.73M*2
EOSINOPHIL # BLD AUTO: 0.22 X10*3/UL (ref 0–0.7)
EOSINOPHIL NFR BLD AUTO: 3.9 %
ERYTHROCYTE [DISTWIDTH] IN BLOOD BY AUTOMATED COUNT: 13.1 % (ref 11.5–14.5)
GLUCOSE SERPL-MCNC: 109 MG/DL (ref 74–99)
HCT VFR BLD AUTO: 33.3 % (ref 41–52)
HGB BLD-MCNC: 10.9 G/DL (ref 13.5–17.5)
IMM GRANULOCYTES # BLD AUTO: 0.01 X10*3/UL (ref 0–0.7)
IMM GRANULOCYTES NFR BLD AUTO: 0.2 % (ref 0–0.9)
LYMPHOCYTES # BLD AUTO: 1.46 X10*3/UL (ref 1.2–4.8)
LYMPHOCYTES NFR BLD AUTO: 26.1 %
MCH RBC QN AUTO: 30.8 PG (ref 26–34)
MCHC RBC AUTO-ENTMCNC: 32.7 G/DL (ref 32–36)
MCV RBC AUTO: 94 FL (ref 80–100)
MONOCYTES # BLD AUTO: 0.55 X10*3/UL (ref 0.1–1)
MONOCYTES NFR BLD AUTO: 9.8 %
NEUTROPHILS # BLD AUTO: 3.26 X10*3/UL (ref 1.2–7.7)
NEUTROPHILS NFR BLD AUTO: 58.2 %
NRBC BLD-RTO: 0 /100 WBCS (ref 0–0)
PLATELET # BLD AUTO: 347 X10*3/UL (ref 150–450)
POTASSIUM SERPL-SCNC: 5.2 MMOL/L (ref 3.5–5.3)
PROT SERPL-MCNC: 7.5 G/DL (ref 6.4–8.2)
RBC # BLD AUTO: 3.54 X10*6/UL (ref 4.5–5.9)
SODIUM SERPL-SCNC: 137 MMOL/L (ref 136–145)
WBC # BLD AUTO: 5.6 X10*3/UL (ref 4.4–11.3)

## 2025-01-28 PROCEDURE — 83880 ASSAY OF NATRIURETIC PEPTIDE: CPT | Performed by: NURSE PRACTITIONER

## 2025-01-28 PROCEDURE — 36415 COLL VENOUS BLD VENIPUNCTURE: CPT | Performed by: NURSE PRACTITIONER

## 2025-01-28 PROCEDURE — 71045 X-RAY EXAM CHEST 1 VIEW: CPT | Performed by: RADIOLOGY

## 2025-01-28 PROCEDURE — 71045 X-RAY EXAM CHEST 1 VIEW: CPT

## 2025-01-28 PROCEDURE — 80053 COMPREHEN METABOLIC PANEL: CPT | Performed by: NURSE PRACTITIONER

## 2025-01-28 PROCEDURE — 99284 EMERGENCY DEPT VISIT MOD MDM: CPT

## 2025-01-28 PROCEDURE — 85025 COMPLETE CBC W/AUTO DIFF WBC: CPT | Performed by: NURSE PRACTITIONER

## 2025-01-28 ASSESSMENT — COLUMBIA-SUICIDE SEVERITY RATING SCALE - C-SSRS
2. HAVE YOU ACTUALLY HAD ANY THOUGHTS OF KILLING YOURSELF?: NO
6. HAVE YOU EVER DONE ANYTHING, STARTED TO DO ANYTHING, OR PREPARED TO DO ANYTHING TO END YOUR LIFE?: NO
1. IN THE PAST MONTH, HAVE YOU WISHED YOU WERE DEAD OR WISHED YOU COULD GO TO SLEEP AND NOT WAKE UP?: NO

## 2025-01-28 NOTE — ED TRIAGE NOTES
Pt sent in for dialysis because the dialysis center can't get him in. He is going to a new dialysis center. Pt complaining of swelling. States that the swelling started yesterday morning.

## 2025-01-28 NOTE — ED TRIAGE NOTES
Emergency Department Encounter  [facility] EMERGENCY MEDICINE    Patient: aRi Gant  MRN: [id number]  : 1965  Date of Evaluation: 2025  Triage Provider: JAMAAL Alberto      Chief Complaint     No chief complaint on file.    Habematolel    (Location/Symptom, Timing/Onset, Context/Setting, Quality, Duration, Modifying Factors, Severity) Note limiting factors.       Rai Gant is a 59 y.o. male who presents to the emergency department complaining of needing dialysis, typically gets dialysis Tuesday, Thursday, Saturday, last had dialysis on Saturday, unable to get dialysis at his normal place because they do not have his information and he was changing locations, now complaining of swelling to bilateral lower extremities and swelling to the face, denies any chest pain, abdominal pain, nausea, vomiting, was told to come to the ER to get his dialysis        Past History   No past medical history on file.  No past surgical history on file.  Social History     Socioeconomic History    Marital status:    Tobacco Use    Smoking status: Never   Substance and Sexual Activity    Alcohol use: Never     Social Drivers of Health     Financial Resource Strain: Patient Declined (4/15/2024)    Overall Financial Resource Strain (CARDIA)     Difficulty of Paying Living Expenses: Patient declined   Food Insecurity: No Food Insecurity (2024)    Received from [facility]    Hunger Vital Sign     Worried About Running Out of Food in the Last Year: Never true     Ran Out of Food in the Last Year: Never true   Transportation Needs: Patient Declined (4/15/2024)    PRAPARE - Transportation     Lack of Transportation (Medical): Patient declined     Lack of Transportation (Non-Medical): Patient declined   Housing Stability: Patient Declined (4/15/2024)    Housing Stability Vital Sign     Unable to Pay for Housing in the Last Year: Patient declined     Number of Places Lived in the Last  Year: 1     Unstable Housing in the Last Year: Patient declined       Medications/Allergies     Previous Medications    METOPROLOL TARTRATE (LOPRESSOR) 25 MG TABLET    Take 1 tablet (25 mg) by mouth 2 times a day.    TRAZODONE (DESYREL) 50 MG TABLET    Take 1 tablet (50 mg) by mouth once daily at bedtime.     No Known Allergies     Physical Exam       ED Triage Vitals   Temp Pulse Resp BP   -- -- -- --      SpO2 Temp src Heart Rate Source Patient Position   -- -- -- --      BP Location FiO2 (%)     -- --         Physical Exam    Focused PE    GENERAL:  The patient appears nourished and normally developed. Vital signs as documented.     PULMONARY:  Lungs are clear to auscultation, without any respiratory distress. Able to speak full sentences, no accessory muscle use    CARDIAC:   Normal rate. No murmurs, rubs or gallops    ABDOMEN:  Soft, non distended, non tender, BS positive x 4 quadrants, No rebound or guarding, no peritoneal signs, may be limited by patient positioning in triage    MUSCULOSKELETAL:   Able to ambulate, Non edematous, with no obvious deformities. Pulses intact distal    SKIN:   Good color, with no significant rashes.  No pallor.    NEURO:  Alert and oriented, speech clear and coherent    Plan     IV, lab work, chest x-ray      For the remainder of the patient's workup and ED course, please see the main ED provider note.  We discussed need for diagnostic testing including lab studies and imaging.  We also discussed that they may be asked to wait in the waiting room while these test are pending.  They understand that if they choose to leave without having the testing completed or resulted that we cannot rule out acute life-threatening illnesses and the risks involved to lead to worsening condition, permanent disability or even death.        Comment: Please note this report has been produced using speech recognition software and may contain errors related to that system including errors in grammar,  punctuation, and spelling, as well as words and phrases that may be inappropriate.  If there are any questions or concerns please feel free to contact the dictating provider for clarification.    VIDA Alberto-CNP